# Patient Record
Sex: FEMALE | URBAN - METROPOLITAN AREA
[De-identification: names, ages, dates, MRNs, and addresses within clinical notes are randomized per-mention and may not be internally consistent; named-entity substitution may affect disease eponyms.]

---

## 2017-02-17 ENCOUNTER — OUTPATIENT (OUTPATIENT)
Dept: OUTPATIENT SERVICES | Facility: HOSPITAL | Age: 37
LOS: 1 days | Discharge: HOME | End: 2017-02-17

## 2017-02-17 ENCOUNTER — APPOINTMENT (OUTPATIENT)
Dept: ANTEPARTUM | Facility: CLINIC | Age: 37
End: 2017-02-17

## 2017-02-17 VITALS — SYSTOLIC BLOOD PRESSURE: 130 MMHG | DIASTOLIC BLOOD PRESSURE: 90 MMHG | BODY MASS INDEX: 30.7 KG/M2 | WEIGHT: 196 LBS

## 2017-02-17 VITALS — HEART RATE: 90 BPM | OXYGEN SATURATION: 100 % | TEMPERATURE: 98 F

## 2017-02-17 LAB
BILIRUB UR QL STRIP: NEGATIVE
CLARITY UR: NORMAL
COLLECTION METHOD: NORMAL
GLUCOSE UR-MCNC: NEGATIVE
HCG UR QL: 0.2 EU/DL
HGB UR QL STRIP.AUTO: NEGATIVE
KETONES UR-MCNC: NORMAL
LEUKOCYTE ESTERASE UR QL STRIP: NEGATIVE
NITRITE UR QL STRIP: NEGATIVE
PH UR STRIP: 8
PROT UR STRIP-MCNC: 4
SP GR UR STRIP: 1.03

## 2017-06-27 DIAGNOSIS — F41.9 ANXIETY DISORDER, UNSPECIFIED: ICD-10-CM

## 2017-06-27 DIAGNOSIS — O99.342 OTHER MENTAL DISORDERS COMPLICATING PREGNANCY, SECOND TRIMESTER: ICD-10-CM

## 2017-06-27 DIAGNOSIS — O35.8XX1 MATERNAL CARE FOR OTHER (SUSPECTED) FETAL ABNORMALITY AND DAMAGE, FETUS 1: ICD-10-CM

## 2017-06-27 DIAGNOSIS — F31.9 BIPOLAR DISORDER, UNSPECIFIED: ICD-10-CM

## 2017-06-27 DIAGNOSIS — Z3A.27 27 WEEKS GESTATION OF PREGNANCY: ICD-10-CM

## 2017-06-27 DIAGNOSIS — O09.522 SUPERVISION OF ELDERLY MULTIGRAVIDA, SECOND TRIMESTER: ICD-10-CM

## 2017-09-05 ENCOUNTER — INPATIENT (INPATIENT)
Facility: HOSPITAL | Age: 37
LOS: 2 days | Discharge: LEFT BEFORE DISCHARGE | End: 2017-09-08
Attending: INTERNAL MEDICINE

## 2017-09-05 DIAGNOSIS — F31.9 BIPOLAR DISORDER, UNSPECIFIED: ICD-10-CM

## 2017-09-05 DIAGNOSIS — G47.9 SLEEP DISORDER, UNSPECIFIED: ICD-10-CM

## 2017-09-05 DIAGNOSIS — F42.9 OBSESSIVE-COMPULSIVE DISORDER, UNSPECIFIED: ICD-10-CM

## 2017-09-05 DIAGNOSIS — F14.20 COCAINE DEPENDENCE, UNCOMPLICATED: ICD-10-CM

## 2017-09-05 DIAGNOSIS — F60.5 OBSESSIVE-COMPULSIVE PERSONALITY DISORDER: ICD-10-CM

## 2017-09-05 DIAGNOSIS — F12.20 CANNABIS DEPENDENCE, UNCOMPLICATED: ICD-10-CM

## 2017-09-05 DIAGNOSIS — F17.200 NICOTINE DEPENDENCE, UNSPECIFIED, UNCOMPLICATED: ICD-10-CM

## 2017-09-05 DIAGNOSIS — F11.20 OPIOID DEPENDENCE, UNCOMPLICATED: ICD-10-CM

## 2017-09-05 DIAGNOSIS — F12.10 CANNABIS ABUSE, UNCOMPLICATED: ICD-10-CM

## 2017-09-05 DIAGNOSIS — F93.0 SEPARATION ANXIETY DISORDER OF CHILDHOOD: ICD-10-CM

## 2017-09-14 DIAGNOSIS — F14.20 COCAINE DEPENDENCE, UNCOMPLICATED: ICD-10-CM

## 2017-09-14 DIAGNOSIS — F12.20 CANNABIS DEPENDENCE, UNCOMPLICATED: ICD-10-CM

## 2017-09-14 DIAGNOSIS — Z53.21 PROCEDURE AND TREATMENT NOT CARRIED OUT DUE TO PATIENT LEAVING PRIOR TO BEING SEEN BY HEALTH CARE PROVIDER: ICD-10-CM

## 2017-09-14 DIAGNOSIS — F60.5 OBSESSIVE-COMPULSIVE PERSONALITY DISORDER: ICD-10-CM

## 2017-09-14 DIAGNOSIS — F11.20 OPIOID DEPENDENCE, UNCOMPLICATED: ICD-10-CM

## 2017-09-14 DIAGNOSIS — F42.9 OBSESSIVE-COMPULSIVE DISORDER, UNSPECIFIED: ICD-10-CM

## 2017-09-14 DIAGNOSIS — F17.200 NICOTINE DEPENDENCE, UNSPECIFIED, UNCOMPLICATED: ICD-10-CM

## 2018-08-24 ENCOUNTER — INPATIENT (INPATIENT)
Facility: HOSPITAL | Age: 38
LOS: 3 days | Discharge: HOME | End: 2018-08-28
Attending: INTERNAL MEDICINE | Admitting: INTERNAL MEDICINE

## 2018-08-24 VITALS
WEIGHT: 169.98 LBS | HEIGHT: 64 IN | RESPIRATION RATE: 16 BRPM | HEART RATE: 66 BPM | DIASTOLIC BLOOD PRESSURE: 77 MMHG | SYSTOLIC BLOOD PRESSURE: 116 MMHG | TEMPERATURE: 98 F

## 2018-08-24 DIAGNOSIS — Z98.890 OTHER SPECIFIED POSTPROCEDURAL STATES: Chronic | ICD-10-CM

## 2018-08-24 DIAGNOSIS — Z86.19 PERSONAL HISTORY OF OTHER INFECTIOUS AND PARASITIC DISEASES: ICD-10-CM

## 2018-08-24 DIAGNOSIS — F12.20 CANNABIS DEPENDENCE, UNCOMPLICATED: ICD-10-CM

## 2018-08-24 DIAGNOSIS — F13.20 SEDATIVE, HYPNOTIC OR ANXIOLYTIC DEPENDENCE, UNCOMPLICATED: ICD-10-CM

## 2018-08-24 DIAGNOSIS — F14.10 COCAINE ABUSE, UNCOMPLICATED: ICD-10-CM

## 2018-08-24 DIAGNOSIS — F11.10 OPIOID ABUSE, UNCOMPLICATED: ICD-10-CM

## 2018-08-24 DIAGNOSIS — F41.9 ANXIETY DISORDER, UNSPECIFIED: ICD-10-CM

## 2018-08-24 DIAGNOSIS — E66.9 OBESITY, UNSPECIFIED: ICD-10-CM

## 2018-08-24 DIAGNOSIS — F43.10 POST-TRAUMATIC STRESS DISORDER, UNSPECIFIED: ICD-10-CM

## 2018-08-24 DIAGNOSIS — F10.20 ALCOHOL DEPENDENCE, UNCOMPLICATED: ICD-10-CM

## 2018-08-24 DIAGNOSIS — F17.200 NICOTINE DEPENDENCE, UNSPECIFIED, UNCOMPLICATED: ICD-10-CM

## 2018-08-24 DIAGNOSIS — F14.20 COCAINE DEPENDENCE, UNCOMPLICATED: ICD-10-CM

## 2018-08-24 DIAGNOSIS — F11.20 OPIOID DEPENDENCE, UNCOMPLICATED: ICD-10-CM

## 2018-08-24 DIAGNOSIS — F31.9 BIPOLAR DISORDER, UNSPECIFIED: ICD-10-CM

## 2018-08-24 LAB
ALBUMIN SERPL ELPH-MCNC: 5 G/DL — SIGNIFICANT CHANGE UP (ref 3.5–5.2)
ALP SERPL-CCNC: 84 U/L — SIGNIFICANT CHANGE UP (ref 30–115)
ALT FLD-CCNC: 41 U/L — SIGNIFICANT CHANGE UP (ref 0–41)
AMPHET UR-MCNC: NEGATIVE — SIGNIFICANT CHANGE UP
ANION GAP SERPL CALC-SCNC: 14 MMOL/L — SIGNIFICANT CHANGE UP (ref 7–14)
APPEARANCE UR: CLEAR — SIGNIFICANT CHANGE UP
AST SERPL-CCNC: 38 U/L — SIGNIFICANT CHANGE UP (ref 0–41)
BARBITURATES UR SCN-MCNC: NEGATIVE — SIGNIFICANT CHANGE UP
BASOPHILS # BLD AUTO: 0.02 K/UL — SIGNIFICANT CHANGE UP (ref 0–0.2)
BASOPHILS NFR BLD AUTO: 0.3 % — SIGNIFICANT CHANGE UP (ref 0–1)
BENZODIAZ UR-MCNC: POSITIVE
BILIRUB SERPL-MCNC: 0.3 MG/DL — SIGNIFICANT CHANGE UP (ref 0.2–1.2)
BILIRUB UR-MCNC: NEGATIVE — SIGNIFICANT CHANGE UP
BUN SERPL-MCNC: 8 MG/DL — LOW (ref 10–20)
CALCIUM SERPL-MCNC: 9.6 MG/DL — SIGNIFICANT CHANGE UP (ref 8.5–10.1)
CHLORIDE SERPL-SCNC: 97 MMOL/L — LOW (ref 98–110)
CHOLEST SERPL-MCNC: 215 MG/DL — HIGH (ref 100–200)
CO2 SERPL-SCNC: 27 MMOL/L — SIGNIFICANT CHANGE UP (ref 17–32)
COCAINE METAB.OTHER UR-MCNC: POSITIVE
COLOR SPEC: YELLOW — SIGNIFICANT CHANGE UP
CREAT SERPL-MCNC: 0.8 MG/DL — SIGNIFICANT CHANGE UP (ref 0.7–1.5)
DIFF PNL FLD: NEGATIVE — SIGNIFICANT CHANGE UP
EOSINOPHIL # BLD AUTO: 0.18 K/UL — SIGNIFICANT CHANGE UP (ref 0–0.7)
EOSINOPHIL NFR BLD AUTO: 3.1 % — SIGNIFICANT CHANGE UP (ref 0–8)
EPI CELLS # UR: ABNORMAL /HPF
ESTIMATED AVERAGE GLUCOSE: 100 MG/DL — SIGNIFICANT CHANGE UP (ref 68–114)
GGT SERPL-CCNC: 38 U/L — SIGNIFICANT CHANGE UP (ref 1–40)
GLUCOSE SERPL-MCNC: 120 MG/DL — HIGH (ref 70–99)
GLUCOSE UR QL: NEGATIVE MG/DL — SIGNIFICANT CHANGE UP
HBA1C BLD-MCNC: 5.1 % — SIGNIFICANT CHANGE UP (ref 4–5.6)
HCG UR QL: NEGATIVE — SIGNIFICANT CHANGE UP
HCT VFR BLD CALC: 43.2 % — SIGNIFICANT CHANGE UP (ref 37–47)
HDLC SERPL-MCNC: 58 MG/DL — SIGNIFICANT CHANGE UP
HGB BLD-MCNC: 14.2 G/DL — SIGNIFICANT CHANGE UP (ref 12–16)
IMM GRANULOCYTES NFR BLD AUTO: 0.3 % — SIGNIFICANT CHANGE UP (ref 0.1–0.3)
KETONES UR-MCNC: NEGATIVE — SIGNIFICANT CHANGE UP
LEUKOCYTE ESTERASE UR-ACNC: NEGATIVE — SIGNIFICANT CHANGE UP
LIPID PNL WITH DIRECT LDL SERPL: 138 MG/DL — HIGH (ref 4–129)
LYMPHOCYTES # BLD AUTO: 1.98 K/UL — SIGNIFICANT CHANGE UP (ref 1.2–3.4)
LYMPHOCYTES # BLD AUTO: 34.4 % — SIGNIFICANT CHANGE UP (ref 20.5–51.1)
MAGNESIUM SERPL-MCNC: 2.2 MG/DL — SIGNIFICANT CHANGE UP (ref 1.8–2.4)
MCHC RBC-ENTMCNC: 29.9 PG — SIGNIFICANT CHANGE UP (ref 27–31)
MCHC RBC-ENTMCNC: 32.9 G/DL — SIGNIFICANT CHANGE UP (ref 32–37)
MCV RBC AUTO: 90.9 FL — SIGNIFICANT CHANGE UP (ref 81–99)
METHADONE UR-MCNC: NEGATIVE — SIGNIFICANT CHANGE UP
MONOCYTES # BLD AUTO: 0.28 K/UL — SIGNIFICANT CHANGE UP (ref 0.1–0.6)
MONOCYTES NFR BLD AUTO: 4.9 % — SIGNIFICANT CHANGE UP (ref 1.7–9.3)
NEUTROPHILS # BLD AUTO: 3.28 K/UL — SIGNIFICANT CHANGE UP (ref 1.4–6.5)
NEUTROPHILS NFR BLD AUTO: 57 % — SIGNIFICANT CHANGE UP (ref 42.2–75.2)
NITRITE UR-MCNC: NEGATIVE — SIGNIFICANT CHANGE UP
NRBC # BLD: 0 /100 WBCS — SIGNIFICANT CHANGE UP (ref 0–0)
OPIATES UR-MCNC: POSITIVE
PCP SPEC-MCNC: SIGNIFICANT CHANGE UP
PH UR: 6.5 — SIGNIFICANT CHANGE UP (ref 5–8)
PLATELET # BLD AUTO: 224 K/UL — SIGNIFICANT CHANGE UP (ref 130–400)
POTASSIUM SERPL-MCNC: 4.3 MMOL/L — SIGNIFICANT CHANGE UP (ref 3.5–5)
POTASSIUM SERPL-SCNC: 4.3 MMOL/L — SIGNIFICANT CHANGE UP (ref 3.5–5)
PROPOXYPHENE QUALITATIVE URINE RESULT: NEGATIVE — SIGNIFICANT CHANGE UP
PROT SERPL-MCNC: 8.1 G/DL — HIGH (ref 6–8)
PROT UR-MCNC: 30 MG/DL
RBC # BLD: 4.75 M/UL — SIGNIFICANT CHANGE UP (ref 4.2–5.4)
RBC # FLD: 12.3 % — SIGNIFICANT CHANGE UP (ref 11.5–14.5)
SODIUM SERPL-SCNC: 138 MMOL/L — SIGNIFICANT CHANGE UP (ref 135–146)
SP GR SPEC: >=1.03 (ref 1.01–1.03)
TOTAL CHOLESTEROL/HDL RATIO MEASUREMENT: 3.7 RATIO — LOW (ref 4–5.5)
TRIGL SERPL-MCNC: 129 MG/DL — SIGNIFICANT CHANGE UP (ref 10–149)
UROBILINOGEN FLD QL: 1 MG/DL (ref 0.2–0.2)
WBC # BLD: 5.76 K/UL — SIGNIFICANT CHANGE UP (ref 4.8–10.8)
WBC # FLD AUTO: 5.76 K/UL — SIGNIFICANT CHANGE UP (ref 4.8–10.8)
WBC UR QL: ABNORMAL /HPF

## 2018-08-24 RX ORDER — QUETIAPINE FUMARATE 200 MG/1
25 TABLET, FILM COATED ORAL DAILY
Qty: 0 | Refills: 0 | Status: DISCONTINUED | OUTPATIENT
Start: 2018-08-24 | End: 2018-08-28

## 2018-08-24 RX ORDER — HYDROCORTISONE 1 %
1 OINTMENT (GRAM) TOPICAL
Qty: 0 | Refills: 0 | Status: DISCONTINUED | OUTPATIENT
Start: 2018-08-24 | End: 2018-08-28

## 2018-08-24 RX ORDER — NICOTINE POLACRILEX 2 MG
1 GUM BUCCAL DAILY
Qty: 0 | Refills: 0 | Status: DISCONTINUED | OUTPATIENT
Start: 2018-08-24 | End: 2018-08-28

## 2018-08-24 RX ORDER — HYDROXYZINE HCL 10 MG
100 TABLET ORAL AT BEDTIME
Qty: 0 | Refills: 0 | Status: DISCONTINUED | OUTPATIENT
Start: 2018-08-24 | End: 2018-08-28

## 2018-08-24 RX ORDER — PHENOBARBITAL 60 MG
32.4 TABLET ORAL EVERY 12 HOURS
Qty: 0 | Refills: 0 | Status: DISCONTINUED | OUTPATIENT
Start: 2018-08-27 | End: 2018-08-28

## 2018-08-24 RX ORDER — ACETAMINOPHEN 500 MG
650 TABLET ORAL EVERY 4 HOURS
Qty: 0 | Refills: 0 | Status: DISCONTINUED | OUTPATIENT
Start: 2018-08-24 | End: 2018-08-28

## 2018-08-24 RX ORDER — ESCITALOPRAM OXALATE 10 MG/1
20 TABLET, FILM COATED ORAL DAILY
Qty: 0 | Refills: 0 | Status: DISCONTINUED | OUTPATIENT
Start: 2018-08-24 | End: 2018-08-28

## 2018-08-24 RX ORDER — BUPRENORPHINE AND NALOXONE 2; .5 MG/1; MG/1
1 TABLET SUBLINGUAL DAILY
Qty: 0 | Refills: 0 | Status: DISCONTINUED | OUTPATIENT
Start: 2018-08-25 | End: 2018-08-28

## 2018-08-24 RX ORDER — PHENOBARBITAL 60 MG
32.4 TABLET ORAL EVERY 6 HOURS
Qty: 0 | Refills: 0 | Status: DISCONTINUED | OUTPATIENT
Start: 2018-08-24 | End: 2018-08-25

## 2018-08-24 RX ORDER — PHENOBARBITAL 60 MG
32.4 TABLET ORAL EVERY 4 HOURS
Qty: 0 | Refills: 0 | Status: DISCONTINUED | OUTPATIENT
Start: 2018-08-24 | End: 2018-08-28

## 2018-08-24 RX ORDER — GABAPENTIN 400 MG/1
300 CAPSULE ORAL THREE TIMES A DAY
Qty: 0 | Refills: 0 | Status: DISCONTINUED | OUTPATIENT
Start: 2018-08-24 | End: 2018-08-28

## 2018-08-24 RX ORDER — MULTIVIT-MIN/FERROUS GLUCONATE 9 MG/15 ML
1 LIQUID (ML) ORAL DAILY
Qty: 0 | Refills: 0 | Status: DISCONTINUED | OUTPATIENT
Start: 2018-08-24 | End: 2018-08-28

## 2018-08-24 RX ORDER — PHENOBARBITAL 60 MG
TABLET ORAL
Qty: 0 | Refills: 0 | Status: COMPLETED | OUTPATIENT
Start: 2018-08-24 | End: 2018-08-28

## 2018-08-24 RX ORDER — MAGNESIUM HYDROXIDE 400 MG/1
30 TABLET, CHEWABLE ORAL ONCE
Qty: 0 | Refills: 0 | Status: DISCONTINUED | OUTPATIENT
Start: 2018-08-24 | End: 2018-08-28

## 2018-08-24 RX ORDER — PSEUDOEPHEDRINE HCL 30 MG
60 TABLET ORAL EVERY 6 HOURS
Qty: 0 | Refills: 0 | Status: DISCONTINUED | OUTPATIENT
Start: 2018-08-24 | End: 2018-08-28

## 2018-08-24 RX ORDER — HYDROXYZINE HCL 10 MG
50 TABLET ORAL EVERY 6 HOURS
Qty: 0 | Refills: 0 | Status: DISCONTINUED | OUTPATIENT
Start: 2018-08-24 | End: 2018-08-25

## 2018-08-24 RX ORDER — PHENOBARBITAL 60 MG
32.4 TABLET ORAL EVERY 6 HOURS
Qty: 0 | Refills: 0 | Status: DISCONTINUED | OUTPATIENT
Start: 2018-08-26 | End: 2018-08-26

## 2018-08-24 RX ORDER — METHOCARBAMOL 500 MG/1
500 TABLET, FILM COATED ORAL EVERY 6 HOURS
Qty: 0 | Refills: 0 | Status: DISCONTINUED | OUTPATIENT
Start: 2018-08-24 | End: 2018-08-28

## 2018-08-24 RX ORDER — IBUPROFEN 200 MG
600 TABLET ORAL EVERY 6 HOURS
Qty: 0 | Refills: 0 | Status: DISCONTINUED | OUTPATIENT
Start: 2018-08-24 | End: 2018-08-28

## 2018-08-24 RX ORDER — QUETIAPINE FUMARATE 200 MG/1
100 TABLET, FILM COATED ORAL AT BEDTIME
Qty: 0 | Refills: 0 | Status: DISCONTINUED | OUTPATIENT
Start: 2018-08-24 | End: 2018-08-28

## 2018-08-24 RX ADMIN — Medication 32.4 MILLIGRAM(S): at 17:16

## 2018-08-24 RX ADMIN — Medication 1 APPLICATION(S): at 16:19

## 2018-08-24 RX ADMIN — Medication 1 TABLET(S): at 13:00

## 2018-08-24 RX ADMIN — GABAPENTIN 300 MILLIGRAM(S): 400 CAPSULE ORAL at 21:18

## 2018-08-24 RX ADMIN — QUETIAPINE FUMARATE 25 MILLIGRAM(S): 200 TABLET, FILM COATED ORAL at 13:01

## 2018-08-24 RX ADMIN — Medication 1 APPLICATION(S): at 21:18

## 2018-08-24 RX ADMIN — Medication 1 PATCH: at 13:04

## 2018-08-24 RX ADMIN — ESCITALOPRAM OXALATE 20 MILLIGRAM(S): 10 TABLET, FILM COATED ORAL at 13:00

## 2018-08-24 RX ADMIN — GABAPENTIN 300 MILLIGRAM(S): 400 CAPSULE ORAL at 13:00

## 2018-08-24 RX ADMIN — Medication 100 MILLIGRAM(S): at 21:21

## 2018-08-24 RX ADMIN — QUETIAPINE FUMARATE 100 MILLIGRAM(S): 200 TABLET, FILM COATED ORAL at 21:18

## 2018-08-24 NOTE — H&P ADULT - ATTENDING COMMENTS
Patient interviewed and examined.    Chart reviewed.    PA's H&P noted and modified, as appropriate.    Case discussed on team rounds    Following is my summary of the case.    Admitted for detox: from ____ED, _x__Intake, ____Med/Surg Floor    Alcohol____   Opioid__x___  Benzo_x__ Other_____    Substance amount, duration of use, last usage, and prior attempts at detox or rehabs, are outlined above in the H&P and discussed with patient.    Associated withdrawal symptoms presents.  Comorbid conditions noted. Chronic and Stable.    Past Medical Hx, Psych Hx, family Hx, Social Hx from H&P reviewed and NO changes.    Old medical record and medication Hx. Reviewed    Following items reviewed and addressed:  1. labs  2. EKG  3. Imaging from PACs module    Examination: no change from PA's exam.    Place on following protocol  _____Medically Managed  __X__Medically Supervised    Ciwa_____Librium taper____Ativan taper___Methadone taper___ Phenobarb taper__x__ Suboxone Induction__x__MMTP____    Narcan Kit Offered    Psych Consult __X__N/A  ___Ordered    Physical Therapy  ___X    ___  Ordered    Aftercare disposition to be addressed by counselors.    Estimated length of stay 3-5 days.

## 2018-08-24 NOTE — H&P ADULT - NSHPPHYSICALEXAM_GEN_ALL_CORE
-  Vital Signs:      Temp: 98.0      Pulse: 74      RR:14       BP:  124/78              Constitutional: anxious A&Ox3, WD/WN  Eyes: PERRLA  Respiratory: +air entry, no rales, no rhonchi, no wheezes  Cardiovascular: +S1 and S2,RRR  Gastrointestinal: +BS, soft, non-tender, not distended, No CVAT  Extremities: no cyanosis, no edema, no calf tenderness,   Vascular: +dorsal pedis and radial pulses, no extremity cyanosis  Neurological: sensation intact, ROM equal B/L, CN II-XII intact, Gait: steady  Skin: no rashes, normal turgor, No track marks   No Decubiti present  No IV lines present  Rectal/Breasts Exam: Deferred

## 2018-08-24 NOTE — H&P ADULT - PMH
Anxiety and depression    Bipolar disorder    Cocaine dependence    Heroin overdose    History of HPV infection    Nicotine dependence    Obesity    Opiate dependence, continuous    PTSD (post-traumatic stress disorder)

## 2018-08-24 NOTE — H&P ADULT - PROBLEM SELECTOR PLAN 3
Check Urine Toxicology  Continue Suboxone 8/2 mg SL daily  Monitor VS and withdrawal symptoms  Prn Medication

## 2018-08-24 NOTE — H&P ADULT - PROBLEM SELECTOR PLAN 5
Encourage Proper DIET & EXERCISE  F/U with PMD after D/C as an outpatient  Check Lipid Profile  Check HgA1C

## 2018-08-24 NOTE — H&P ADULT - PROBLEM SELECTOR PLAN 7
Observation  Atarax 50 mg po Q6H  PRN anxiety  Atarax 100 mg po QHS PRN Insomnia  Psych. Consult Prn  Continue :  Lexapro 20 mg po daily  Neurontin 300 mg po TID  Seroquel 25 mg po daily  Seroquel 100 mg po q hs

## 2018-08-24 NOTE — H&P ADULT - ASSESSMENT
This is 37 Y/O  Female with Hx of Continuous Benzo,Opiateand Crack dependency,on Prescribed Suboxone by DR. Vineet Bundy. S/P Detox at [facility] 9/05/2017-9/08/2017,stayed clean X 4 months,relapsed 3 months PTA

## 2018-08-24 NOTE — H&P ADULT - HISTORY OF PRESENT ILLNESS
This is 37 Y/O  Female with Hx of Continuous Benzo,Opiateand Crack dependency,on Prescribed Suboxone by DR. Vineet Bundy. S/P Detox at [facility] 2017-2017,stayed clean X 4 months,relapsed 3 months PTA    DRUG	AGE OF ONSET	ROUTE	FREQ	AMOUNT	LAST USE	LENGTH OF CURRENT USE	  HEROIN	34 Y/O	In/IV	Daily	10 bags 	2018 7 Bags	3 Months	  XANAX	15 Y/O	PO	Daily	4 mg	2018 4 mg	3 Months	  CRACK	34 Y/O	Smoking	Daily	$50	2018 $50	3 Months	  SUBOXONE 8/2 mg	37 Y/O	SL	Daily	1 Film	2018	6 Month	  							  Opiate W/D: Mylagia,N/V/D,HA,Diaphoresis,anxiety,and Insomnia,                       (+) Accidental OD X 2 the past 2 weeks  Patient A&Ox3, denies CP, sob,Abdominal pain,blurry Vision, headache, dizziness, bleeding or  dysuria.  OBGYN Hx: LMP: 2018    last Pap smear 2018  ( Hx of HPV)     P: 0    S/P  X 1,and Miscarriage X 1           last   Mammography :  (WNL)  Hx x of Withdrawal Seizures: No   Psyhx: Bipolar D/O,Anxiety,PTSD   Compliant with meds             Denies any S/H Ideation or A/V Hallucination  Screening history	Last tested	Result	History of treatment	  HIV		NEG	N/A	  Hepatitis C	2017	NEG	N/A	  Quantiferon GOLD TB test	2015	NEG	N/A	    Immunization	Not Received	Unknown	Received	Date Received 	  Influenza		v			  Pneumococcus		v			  Tetanus			v	Ø	10 years	  Others					  					    I-Stop:   Patient Name:	Rosalva Wayne	YOB: 1980	  Address:	[address] [place] [address]	Sex:	Female	  Rx Written	Rx Dispensed	Drug	Quantity	Days Supply	Prescriber Name	  2018	zolpidem tartrate 5 mg tablet	30	30	Vineet Bundy MD	  07/10/2018	07/10/2018	suboxone 8 mg-2 mg sl film	60	30	Vineet Bundy MD	  2018	phentermine 30 mg capsule	30	30	Vineet Bundy MD	  2018	suboxone 8 mg-2 mg sl film	60	30	Vineet Bundy MD

## 2018-08-25 LAB
HAV IGM SER-ACNC: SIGNIFICANT CHANGE UP
HBV CORE IGM SER-ACNC: SIGNIFICANT CHANGE UP
HBV SURFACE AG SER-ACNC: SIGNIFICANT CHANGE UP
HCV AB S/CO SERPL IA: 0.14 S/CO — SIGNIFICANT CHANGE UP
HCV AB SERPL-IMP: SIGNIFICANT CHANGE UP
HIV 1+2 AB+HIV1 P24 AG SERPL QL IA: SIGNIFICANT CHANGE UP
T PALLIDUM AB TITR SER: NEGATIVE — SIGNIFICANT CHANGE UP

## 2018-08-25 RX ORDER — HYDROXYZINE HCL 10 MG
50 TABLET ORAL
Qty: 0 | Refills: 0 | Status: DISCONTINUED | OUTPATIENT
Start: 2018-08-25 | End: 2018-08-26

## 2018-08-25 RX ADMIN — QUETIAPINE FUMARATE 25 MILLIGRAM(S): 200 TABLET, FILM COATED ORAL at 11:13

## 2018-08-25 RX ADMIN — Medication 50 MILLIGRAM(S): at 10:08

## 2018-08-25 RX ADMIN — GABAPENTIN 300 MILLIGRAM(S): 400 CAPSULE ORAL at 12:47

## 2018-08-25 RX ADMIN — Medication 1 PATCH: at 09:44

## 2018-08-25 RX ADMIN — QUETIAPINE FUMARATE 100 MILLIGRAM(S): 200 TABLET, FILM COATED ORAL at 21:23

## 2018-08-25 RX ADMIN — ESCITALOPRAM OXALATE 20 MILLIGRAM(S): 10 TABLET, FILM COATED ORAL at 11:13

## 2018-08-25 RX ADMIN — Medication 32.4 MILLIGRAM(S): at 05:53

## 2018-08-25 RX ADMIN — Medication 1 APPLICATION(S): at 09:44

## 2018-08-25 RX ADMIN — Medication 32.4 MILLIGRAM(S): at 23:38

## 2018-08-25 RX ADMIN — GABAPENTIN 300 MILLIGRAM(S): 400 CAPSULE ORAL at 21:23

## 2018-08-25 RX ADMIN — Medication 32.4 MILLIGRAM(S): at 00:30

## 2018-08-25 RX ADMIN — GABAPENTIN 300 MILLIGRAM(S): 400 CAPSULE ORAL at 09:45

## 2018-08-25 RX ADMIN — Medication 32.4 MILLIGRAM(S): at 18:24

## 2018-08-25 RX ADMIN — Medication 32.4 MILLIGRAM(S): at 12:47

## 2018-08-25 RX ADMIN — Medication 600 MILLIGRAM(S): at 22:45

## 2018-08-25 RX ADMIN — Medication 1 TABLET(S): at 09:44

## 2018-08-25 RX ADMIN — Medication 1 APPLICATION(S): at 21:23

## 2018-08-25 RX ADMIN — Medication 600 MILLIGRAM(S): at 21:04

## 2018-08-25 RX ADMIN — Medication 50 MILLIGRAM(S): at 21:23

## 2018-08-25 RX ADMIN — Medication 1 PATCH: at 15:00

## 2018-08-26 LAB
GAMMA INTERFERON BACKGROUND BLD IA-ACNC: 0.04 IU/ML — SIGNIFICANT CHANGE UP
M TB IFN-G BLD-IMP: NEGATIVE — SIGNIFICANT CHANGE UP
M TB IFN-G CD4+ BCKGRND COR BLD-ACNC: 0.08 IU/ML — SIGNIFICANT CHANGE UP
M TB IFN-G CD4+CD8+ BCKGRND COR BLD-ACNC: 0.07 IU/ML — SIGNIFICANT CHANGE UP
QUANT TB PLUS MITOGEN MINUS NIL: >10 IU/ML — SIGNIFICANT CHANGE UP

## 2018-08-26 RX ORDER — HYDROXYZINE HCL 10 MG
50 TABLET ORAL EVERY 8 HOURS
Qty: 0 | Refills: 0 | Status: DISCONTINUED | OUTPATIENT
Start: 2018-08-26 | End: 2018-08-28

## 2018-08-26 RX ORDER — HYDROXYZINE HCL 10 MG
50 TABLET ORAL ONCE
Qty: 0 | Refills: 0 | Status: COMPLETED | OUTPATIENT
Start: 2018-08-26 | End: 2018-08-26

## 2018-08-26 RX ADMIN — QUETIAPINE FUMARATE 100 MILLIGRAM(S): 200 TABLET, FILM COATED ORAL at 21:18

## 2018-08-26 RX ADMIN — Medication 1 PATCH: at 09:09

## 2018-08-26 RX ADMIN — Medication 50 MILLIGRAM(S): at 09:07

## 2018-08-26 RX ADMIN — BUPRENORPHINE AND NALOXONE 1 TABLET(S): 2; .5 TABLET SUBLINGUAL at 09:06

## 2018-08-26 RX ADMIN — Medication 1 APPLICATION(S): at 09:07

## 2018-08-26 RX ADMIN — Medication 50 MILLIGRAM(S): at 18:18

## 2018-08-26 RX ADMIN — Medication 100 MILLIGRAM(S): at 21:20

## 2018-08-26 RX ADMIN — ESCITALOPRAM OXALATE 20 MILLIGRAM(S): 10 TABLET, FILM COATED ORAL at 09:06

## 2018-08-26 RX ADMIN — Medication 1 TABLET(S): at 09:07

## 2018-08-26 RX ADMIN — GABAPENTIN 300 MILLIGRAM(S): 400 CAPSULE ORAL at 09:06

## 2018-08-26 RX ADMIN — Medication 32.4 MILLIGRAM(S): at 05:51

## 2018-08-26 RX ADMIN — QUETIAPINE FUMARATE 25 MILLIGRAM(S): 200 TABLET, FILM COATED ORAL at 09:08

## 2018-08-26 RX ADMIN — GABAPENTIN 300 MILLIGRAM(S): 400 CAPSULE ORAL at 21:18

## 2018-08-26 NOTE — CHART NOTE - NSCHARTNOTEFT_GEN_A_CORE
Subsequent Inpatient Encounter                                       Detox Unit    MARQUITA CUMMINGS   38y   Female      Chief Complaint:    Follow up for Polysubstance  Dependency    HPI:     I reviewed previous notes. No Change, except if noted below.             Detail:_    ROS:   I reviewed with patient.  No changes from previous notes except if noted below.             Detail: _    PFSH I reviewed with patient. No changes from previous notes except if noted below.             Detail_    Medication reconciliation performed.    MEDICATIONS  (STANDING):  buprenorphine 8 mG/naloxone 2 mG SL  Tablet 1 Tablet(s) SubLingual daily  escitalopram 20 milliGRAM(s) Oral daily  gabapentin 300 milliGRAM(s) Oral three times a day  hydrocortisone 1% Cream 1 Application(s) Topical two times a day  hydrOXYzine hydrochloride 50 milliGRAM(s) Oral two times a day  multivitamin/minerals 1 Tablet(s) Oral daily  nicotine - 21 mG/24Hr(s) Patch 1 Patch Transdermal daily  PHENobarbital 32.4 milliGRAM(s) Oral every 6 hours  PHENobarbital   Oral   QUEtiapine 25 milliGRAM(s) Oral daily  QUEtiapine 100 milliGRAM(s) Oral at bedtime      MEDICATIONS  (PRN):  acetaminophen   Tablet 650 milliGRAM(s) Oral every 4 hours PRN For Temp greater than 38.5 C (101.3 F)  aluminum hydroxide/magnesium hydroxide/simethicone Suspension 30 milliLiter(s) Oral every 6 hours PRN Heartburn  bismuth subsalicylate Liquid 30 milliLiter(s) Oral every 6 hours PRN Diarrhea  cloNIDine 0.1 milliGRAM(s) Oral every 8 hours PRN Blood Pressure GREATER THAN 140/90 mmHG  cloNIDine 0.1 milliGRAM(s) Oral every 8 hours PRN opiate withdrawal  guaiFENesin/dextromethorphan  Syrup 5 milliLiter(s) Oral every 4 hours PRN Cough  hydrOXYzine hydrochloride 100 milliGRAM(s) Oral at bedtime PRN insomnia  ibuprofen  Tablet 600 milliGRAM(s) Oral every 6 hours PRN Mild Pain (1-3)  magnesium hydroxide Suspension 30 milliLiter(s) Oral once PRN Constipation  methocarbamol 500 milliGRAM(s) Oral every 6 hours PRN muscle pain  PHENobarbital 32.4 milliGRAM(s) Oral every 4 hours PRN Withdrawal  pseudoephedrine 60 milliGRAM(s) Oral every 6 hours PRN Rhinitis  trimethobenzamide 300 milliGRAM(s) Oral every 6 hours PRN Nausea and/or Vomiting  trimethobenzamide Injectable 200 milliGRAM(s) IntraMuscular every 6 hours PRN Nausea and/or Vomiting      T(C): 36.7 (08-26-18 @ 06:00), Max: 36.8 (08-25-18 @ 12:00)  HR: 69 (08-26-18 @ 06:00) (65 - 70)  BP: 109/67 (08-26-18 @ 06:00) (109/67 - 126/73)  RR: 14 (08-26-18 @ 06:00) (14 - 16)  SpO2: --    PHYSICAL EXAM:      Constitutional: NAD, A&O x3    Eyes: PERRLA, no conjuctivitis    Neck: no lymphadenopathy    Respiratory: +air entry, no rales, no rhonchi, no wheezes    Cardiovascular: +S1 and S2, regular rate and rhythm    Gastrointestinal: +BS, soft, non-tender, not distended    Extremities:  no edema, no calf tenderness    Skin: no rashes, normal turgor                            14.2   5.76  )-----------( 224      ( 24 Aug 2018 12:50 )             43.2   08-24    138  |  97<L>  |  8<L>  ----------------------------<  120<H>  4.3   |  27  |  0.8    Ca    9.6      24 Aug 2018 12:50  Mg     2.2     08-24    TPro  8.1<H>  /  Alb  5.0  /  TBili  0.3  /  DBili  x   /  AST  38  /  ALT  41  /  AlkPhos  84  08-24    Magnesium, Serum: 2.2 mg/dL (08-24-18 @ 12:50)  Hemoglobin A1C, Whole Blood: 5.1 % (08-24-18 @ 12:50)  Treponema Pallidum Antibody Interpretation: Negative (08-24-18 @ 12:50)  Hepatitis B Surface Antigen: Nonreact (08-24-18 @ 12:50)  Hepatitis C Virus S/CO Ratio: 0.14 S/CO (08-24-18 @ 12:50)    Hepatitis C Virus Interpretation: Nonreact (08-24-18 @ 12:50)      Urinalysis Basic - ( 24 Aug 2018 12:39 )    Color: Yellow / Appearance: Clear / SG: >=1.030 / pH: x  Gluc: x / Ketone: Negative  / Bili: Negative / Urobili: 1.0 mg/dL   Blood: x / Protein: 30 mg/dL / Nitrite: Negative   Leuk Esterase: Negative / RBC: x / WBC 6-10 /HPF   Sq Epi: x / Non Sq Epi: Moderate /HPF / Bacteria: x          Impression and Plan:    Primary Diagnosis:  Benzo/Opiate Dependency                                Medication: Pheno/Suboxone    Secondary Diagnosis:                                                                                Medication:    Tertiary Diagnosis:                                                                                     Medication:      Continue Detox Protocols. Use of PRNS as needed for withdrawal and comfort.    Adjustments to protocols:    Labs/ Tests reviewed.    Tests ordered:     Likely Disposition: _X__Home       ___Rehab       ___Outpatient Program    ___Self Help     _____Other    Estimated Length of stay:_4___

## 2018-08-27 RX ADMIN — Medication 32.4 MILLIGRAM(S): at 06:12

## 2018-08-27 RX ADMIN — ESCITALOPRAM OXALATE 20 MILLIGRAM(S): 10 TABLET, FILM COATED ORAL at 09:35

## 2018-08-27 RX ADMIN — QUETIAPINE FUMARATE 100 MILLIGRAM(S): 200 TABLET, FILM COATED ORAL at 21:30

## 2018-08-27 RX ADMIN — BUPRENORPHINE AND NALOXONE 1 TABLET(S): 2; .5 TABLET SUBLINGUAL at 09:35

## 2018-08-27 RX ADMIN — GABAPENTIN 300 MILLIGRAM(S): 400 CAPSULE ORAL at 09:35

## 2018-08-27 RX ADMIN — Medication 1 PATCH: at 09:36

## 2018-08-27 RX ADMIN — Medication 50 MILLIGRAM(S): at 09:39

## 2018-08-27 RX ADMIN — QUETIAPINE FUMARATE 25 MILLIGRAM(S): 200 TABLET, FILM COATED ORAL at 09:36

## 2018-08-27 RX ADMIN — Medication 1 TABLET(S): at 09:36

## 2018-08-27 RX ADMIN — Medication 100 MILLIGRAM(S): at 21:31

## 2018-08-27 RX ADMIN — Medication 1 PATCH: at 09:39

## 2018-08-27 RX ADMIN — Medication 1 APPLICATION(S): at 09:36

## 2018-08-27 RX ADMIN — GABAPENTIN 300 MILLIGRAM(S): 400 CAPSULE ORAL at 21:30

## 2018-08-27 RX ADMIN — Medication 32.4 MILLIGRAM(S): at 17:58

## 2018-08-28 VITALS
SYSTOLIC BLOOD PRESSURE: 91 MMHG | TEMPERATURE: 97 F | HEART RATE: 66 BPM | RESPIRATION RATE: 16 BRPM | DIASTOLIC BLOOD PRESSURE: 51 MMHG

## 2018-08-28 RX ADMIN — Medication 1 PATCH: at 09:09

## 2018-08-28 RX ADMIN — BUPRENORPHINE AND NALOXONE 1 TABLET(S): 2; .5 TABLET SUBLINGUAL at 09:06

## 2018-08-28 RX ADMIN — ESCITALOPRAM OXALATE 20 MILLIGRAM(S): 10 TABLET, FILM COATED ORAL at 09:06

## 2018-08-28 RX ADMIN — Medication 1 APPLICATION(S): at 09:07

## 2018-08-28 RX ADMIN — QUETIAPINE FUMARATE 25 MILLIGRAM(S): 200 TABLET, FILM COATED ORAL at 09:06

## 2018-08-28 RX ADMIN — Medication 1 TABLET(S): at 09:06

## 2018-08-28 RX ADMIN — GABAPENTIN 300 MILLIGRAM(S): 400 CAPSULE ORAL at 09:06

## 2018-09-04 DIAGNOSIS — F14.20 COCAINE DEPENDENCE, UNCOMPLICATED: ICD-10-CM

## 2018-09-04 DIAGNOSIS — F31.9 BIPOLAR DISORDER, UNSPECIFIED: ICD-10-CM

## 2018-09-04 DIAGNOSIS — F32.9 MAJOR DEPRESSIVE DISORDER, SINGLE EPISODE, UNSPECIFIED: ICD-10-CM

## 2018-09-04 DIAGNOSIS — F13.20 SEDATIVE, HYPNOTIC OR ANXIOLYTIC DEPENDENCE, UNCOMPLICATED: ICD-10-CM

## 2018-09-04 DIAGNOSIS — F17.210 NICOTINE DEPENDENCE, CIGARETTES, UNCOMPLICATED: ICD-10-CM

## 2018-09-04 DIAGNOSIS — F41.9 ANXIETY DISORDER, UNSPECIFIED: ICD-10-CM

## 2018-09-04 DIAGNOSIS — F43.10 POST-TRAUMATIC STRESS DISORDER, UNSPECIFIED: ICD-10-CM

## 2018-09-04 DIAGNOSIS — F11.20 OPIOID DEPENDENCE, UNCOMPLICATED: ICD-10-CM

## 2018-09-04 DIAGNOSIS — E66.9 OBESITY, UNSPECIFIED: ICD-10-CM

## 2019-04-01 ENCOUNTER — OUTPATIENT (OUTPATIENT)
Dept: OUTPATIENT SERVICES | Facility: HOSPITAL | Age: 39
LOS: 1 days | End: 2019-04-01

## 2019-04-01 DIAGNOSIS — Z98.890 OTHER SPECIFIED POSTPROCEDURAL STATES: Chronic | ICD-10-CM

## 2019-04-10 ENCOUNTER — EMERGENCY (EMERGENCY)
Facility: HOSPITAL | Age: 39
LOS: 0 days | Discharge: HOME | End: 2019-04-10
Attending: EMERGENCY MEDICINE | Admitting: EMERGENCY MEDICINE
Payer: MEDICAID

## 2019-04-10 VITALS
RESPIRATION RATE: 18 BRPM | DIASTOLIC BLOOD PRESSURE: 67 MMHG | SYSTOLIC BLOOD PRESSURE: 115 MMHG | TEMPERATURE: 99 F | HEART RATE: 78 BPM | OXYGEN SATURATION: 99 %

## 2019-04-10 VITALS — HEIGHT: 64 IN | WEIGHT: 169.98 LBS

## 2019-04-10 DIAGNOSIS — F17.200 NICOTINE DEPENDENCE, UNSPECIFIED, UNCOMPLICATED: ICD-10-CM

## 2019-04-10 DIAGNOSIS — Z98.890 OTHER SPECIFIED POSTPROCEDURAL STATES: Chronic | ICD-10-CM

## 2019-04-10 DIAGNOSIS — Z79.899 OTHER LONG TERM (CURRENT) DRUG THERAPY: ICD-10-CM

## 2019-04-10 DIAGNOSIS — L03.113 CELLULITIS OF RIGHT UPPER LIMB: ICD-10-CM

## 2019-04-10 DIAGNOSIS — M79.601 PAIN IN RIGHT ARM: ICD-10-CM

## 2019-04-10 DIAGNOSIS — L02.511 CUTANEOUS ABSCESS OF RIGHT HAND: ICD-10-CM

## 2019-04-10 PROCEDURE — 73090 X-RAY EXAM OF FOREARM: CPT | Mod: 26,RT

## 2019-04-10 PROCEDURE — 99283 EMERGENCY DEPT VISIT LOW MDM: CPT | Mod: 25

## 2019-04-10 PROCEDURE — 10060 I&D ABSCESS SIMPLE/SINGLE: CPT

## 2019-04-10 RX ORDER — CIPROFLOXACIN LACTATE 400MG/40ML
1 VIAL (ML) INTRAVENOUS
Qty: 14 | Refills: 0 | OUTPATIENT
Start: 2019-04-10 | End: 2019-04-16

## 2019-04-10 RX ORDER — IBUPROFEN 200 MG
600 TABLET ORAL ONCE
Qty: 0 | Refills: 0 | Status: COMPLETED | OUTPATIENT
Start: 2019-04-10 | End: 2019-04-10

## 2019-04-10 RX ORDER — CIPROFLOXACIN LACTATE 400MG/40ML
500 VIAL (ML) INTRAVENOUS ONCE
Qty: 0 | Refills: 0 | Status: COMPLETED | OUTPATIENT
Start: 2019-04-10 | End: 2019-04-10

## 2019-04-10 RX ORDER — ACETAMINOPHEN 500 MG
975 TABLET ORAL ONCE
Qty: 0 | Refills: 0 | Status: COMPLETED | OUTPATIENT
Start: 2019-04-10 | End: 2019-04-10

## 2019-04-10 RX ADMIN — Medication 975 MILLIGRAM(S): at 18:30

## 2019-04-10 RX ADMIN — Medication 500 MILLIGRAM(S): at 18:30

## 2019-04-10 RX ADMIN — Medication 100 MILLIGRAM(S): at 18:30

## 2019-04-10 RX ADMIN — Medication 600 MILLIGRAM(S): at 18:29

## 2019-04-10 NOTE — ED PROVIDER NOTE - NSFOLLOWUPINSTRUCTIONS_ED_ALL_ED_FT
Abscess    An abscess is an infected area that contains a collection of pus and debris. It can occur in almost any part of the body and occurs when the tissue gets infection. Symptoms include a painful mass that is red, warm, tender that might break open and HAVE drainage. If your health care provider gave you antibiotics make sure to take the full course and do not stop even if feeling better.     SEEK IMMEDIATE MEDICAL CARE IF YOU HAVE ANY OF THE FOLLOWING SYMPTOMS: chills, fever, muscle aches, or red streaking from the area.    Cellulitis    Cellulitis is a skin infection caused by bacteria. This condition occurs most often in the arms and lower legs but can occur anywhere over the body. Symptoms include redness, swelling, warm skin, tenderness, and chills/fever. If you were prescribed an antibiotic medicine, take it as told by your health care provider. Do not stop taking the antibiotic even if you start to feel better.    SEEK IMMEDIATE MEDICAL CARE IF YOU HAVE ANY OF THE FOLLOWING SYMPTOMS: worsening fever, red streaks coming from affected area, vomiting or diarrhea, or dizziness/lightheadedness.

## 2019-04-10 NOTE — ED PROVIDER NOTE - NSFOLLOWUPCLINICS_GEN_ALL_ED_FT
[facility]  Surgery  [address] [place] [address]  Phone: [phone]  Fax:   Follow Up Time: 1-3 Days    [facility]  Infectious Disease  [address] [place] [id number]  Phone: [phone]  Fax:   Follow Up Time: 1-3 Days

## 2019-04-10 NOTE — ED PROVIDER NOTE - NS ED ROS FT
Constitutional: no fever, chills   Cardiovascular: no chest pain, no sob  Respiratory: no cough, no shortness of breat  Gastrointestinal: no nausea, vomiting or abd pain  Musculoskeletal: no msk pain or injury. no fall or trauma  Integumentary:  + abscess to right forearm with surrounding erythema and swelling  Neurological: no LE weakness or paresthesias

## 2019-04-10 NOTE — ED ADULT NURSE NOTE - CHIEF COMPLAINT QUOTE
Pt BIBA from [facility] for right arm swelling/abscess with hx of IV Drug Abuse. Pt states last time she administered IV drugs was early this morning. Pt denies any suicidal ideations.

## 2019-04-10 NOTE — ED ADULT TRIAGE NOTE - CHIEF COMPLAINT QUOTE
Pt BIBA from [facility] for right arm swelling/abscess with hx of IV Drug Abuse. Pt BIBA from [facility] for right arm swelling/abscess with hx of IV Drug Abuse. Pt states last time she administered IV drugs was early this morning. Pt BIBA from [facility] for right arm swelling/abscess with hx of IV Drug Abuse. Pt states last time she administered IV drugs was early this morning. Pt denies any suicidal ideations.

## 2019-04-10 NOTE — ED ADULT NURSE NOTE - NS ED NOTE ABUSE SUSPICION NEGLECT YN
[facility] Emergency [facility]    [address] [facility] [address]    Phone:  [phone]    Fax:  [phone]       Thank You for choosing us for your health care visit. We are glad to serve you and happy to provide you with this summary of your visit. Please help us to ensure we have accurate records. If you find anything that needs to be changed, please let our staff know as soon as possible.          Your Demographic Information     Patient Name Sex Raquel Calle A Female 1983       Ethnic Group Patient Race    Not of  or  Origin White      Your Visit Details     Date & Time Provider Department    2017 2:05 PM Medardo Saunders NP [facility] Emergency [facility]      Your Upcoming Appointment*(Max 10)     Thursday May 04, 2017 11:40 AM CDT   Obstetric Check with Lynn Mishra DO   [facility] ([facility])    [address] [place] [id number]   [phone]              Your To Do List     Follow-Up    Return if symptoms worsen or fail to improve.      We Ordered or Performed the Following     URINALYSIS WITH MICRO & CULTURE IF INDICATED       Conditions Discussed Today or Order-Related Diagnoses        Comments    Allergic rhinitis, unspecified allergic rhinitis trigger, unspecified rhinitis seasonality    -  Primary     Fatigue, unspecified type         Itchy eyes           Your Vitals Were     BP Pulse Temp Resp Height Weight    115/59 92 97.5 °F (36.4 °C) (Temporal Artery) 12 5' 6\" (1.676 m) 226 lb 10.1 oz (102.8 kg)    LMP SpO2 BMI Smoking Status          2016 (Approximate) 97% 36.58 kg/m2 Former Smoker        Medications Prescribed or Re-Ordered Today     None      Your Current Medications Are        Disp Refills Start End    Prenatal Vit-Fe Fumarate-FA (PRENATAL PO)        Class: Historical Med    Route: Oral    FIBER SELECT GUMMIES PO        Class: Historical Med    Route: Oral    metoCLOPramide (REGLAN) 10 MG tablet 12 tablet 0 12/6/2016     Sig - Route: Take 1 tablet by mouth every 8 hours as needed for Nausea (vomiting). - Oral    ondansetron (ZOFRAN ODT) 8 MG disintegrating tablet 60 tablet 0 12/2/2016     Sig - Route: Take 1 tablet by mouth every 8 hours as needed for Nausea. - Oral    Class: Eprescribe    promethazine (PHENERGAN) 25 MG tablet 30 tablet 0 11/16/2016     Sig - Route: Take 1 tablet by mouth every 6 hours as needed for Nausea. - Oral    Class: Eprescribe    sertraline (ZOLOFT) 100 MG tablet 30 tablet 11 4/20/2016     Sig - Route: Take 1 tablet by mouth daily. - Oral    Class: Eprescribe    Notes to Pharmacy: Disregard previous Rx      Allergies     Bactrim RASH    Nutrasweet Aspartame [Aspartame] GI UPSET      Immunizations History as of 4/26/2017     Name Date    DTaP 3/5/1986, 1/8/1986, 4/18/1984    Hepatitis B Child 4/18/2000    INFLUENZA QUADRIVALENT 11/2/2016, 11/30/2015  8:53 AM    MMR 1/8/1986    Polio, ORAL 1/8/1986, 4/18/1984    RH IMMUNE GLOBULIN 4/14/2017    Tdap 4/13/2017, 12/2/2014      Problem List as of 4/26/2017     Depression with anxiety    Obesity (BMI 30-39.9)    Migraine with aura and without status migrainosus, not intractable      Results of Testing Done Today     URINALYSIS WITH MICRO & CULTURE IF INDICATED      Component Value Standard Range & Units    COLOR YELLOW YELLOW    APPEARANCE HAZY     GLUCOSE(URINE) NEGATIVE NEGATIVE mg/dL    BILIRUBIN NEGATIVE NEGATIVE    KETONES NEGATIVE NEGATIVE mg/dL    SPECIFIC GRAVITY 1.020 1.005 - 1.030    BLOOD NEGATIVE NEGATIVE    pH 6.5 5.0 - 7.0 Units    PROTEIN(URINE) 30 NEGATIVE mg/dL    UROBILINOGEN 0.2 0.0 - 1.0 mg/dL    NITRITE NEGATIVE NEGATIVE    LEUKOCYTE ESTERASE NEGATIVE NEGATIVE    Squamous EPI'S 6 to 10 0 - 5 /hpf    RBC 4 to 5 0 - 3 /hpf    WBC 6 to 10 0 - 5 /hpf    BACTERIA FEW NONE SEEN /hpf    Hyaline Casts NONE SEEN 0 - 5 /lpf    SPECIMEN TYPE URINE, CLEAN  CATCH/MIDSTREAM     MUCOUS PRESENT                           Patient Instructions      Allergic Rhinitis  Allergic rhinitis is an allergic reaction that affects the nose, and often the eyes. It’s often known as nasal allergies. Nasal allergies are often due to things in the environment that are breathed in. Depending what you are sensitive to, nasal allergies may occur only during certain seasons. Or they may occur year round. Common indoor allergens include house dust mites, mold, cockroaches, and pet dander. Outdoor allergens include pollen from trees, grasses, and weeds.   Symptoms include a drippy, stuffy, and itchy nose. They also include sneezing and red and itchy eyes. You may feel tired more often. Severe allergies may also affect your breathing and trigger a condition called asthma.   Tests can be done to see what allergens are affecting you. You may be referred to an allergy specialist for testing and further evaluation.  Home care  The healthcare provider may prescribe medicines to help relieve allergy symptoms.   Ask the provider for advice on how to avoid substances that you are allergic to. Below are a few tips for each type of allergen.  Pet dander:  · Do not have pets with fur and feathers.  · If you cannot avoid having a pet, keep it out of your bedroom and off upholstered furniture.  Pollen:  · When pollen counts are high, keep windows of your car and home closed. If possible, use an air conditioner instead.  · Wear a filter mask when mowing or doing yard work.  House dust mites:  · Wash bedding every week in warm water and detergent and dry on a hot setting.  · Cover the mattress, box spring, and pillows with allergy covers.   · If possible, sleep in a room with no carpet, curtains, or upholstered furniture.  Cockroaches:  · Store food in sealed containers.  · Remove garbage from the home promptly.  · Fix water leaks  Mold:  · Keep humidity low by using a dehumidifier or air conditioner. Keep the  dehumidifier and air conditioner clean and free of mold.  · Clean moldy areas with bleach and water.  In general:  · Vacuum once or twice a week. If possible, use a vacuum with a high-efficiency particulate air (HEPA) filter.  · Do not smoke. Avoid cigarette smoke. Cigarette smoke is an irritant that can make symptoms worse.  Follow-up care  Follow up as advised by the health care provider or our staff. If you were referred to an allergy specialist, make this appointment promptly.  When to seek medical advice  Call your healthcare provider right away if the following occur:  · Coughing or wheezing  · Fever greater than 100.4°F (38°C)  · Continuing symptoms, new symptoms, or worsening symptoms  Call 911 right away if you have:  · Trouble breathing  · Hives (raised red bumps)  · Severe swelling of the face or severe itching of the eyes or mouth  © 5338-1758 [employer]. [address] [place] [address]. All rights reserved. This information is not intended as a substitute for professional medical care. Always follow your healthcare professional's instructions.      Loratadine Oral tablet  What is this medicine?  LORATADINE (arnoldo AT a glendy) is an antihistamine. It helps to relieve sneezing, runny nose, and itchy, watery eyes. This medicine is used to treat the symptoms of allergies. It is also used to treat itchy skin rash and hives.  This medicine may be used for other purposes; ask your health care provider or pharmacist if you have questions.  What should I tell my health care provider before I take this medicine?  They need to know if you have any of these conditions:  · asthma  · kidney disease  · liver disease  · an unusual or allergic reaction to loratadine, other antihistamines, other medicines, foods, dyes, or preservatives  · pregnant or trying to get pregnant  · breast-feeding  How should I use this medicine?  Take this medicine by mouth with a glass of water. Follow the directions on  the label. You may take this medicine with food or on an empty stomach. Take your medicine at regular intervals. Do not take your medicine more often than directed.  Talk to your pediatrician regarding the use of this medicine in children. While this medicine may be used in children as young as 6 years for selected conditions, precautions do apply.  Overdosage: If you think you have taken too much of this medicine contact a poison control center or emergency room at once.  NOTE: This medicine is only for you. Do not share this medicine with others.  What if I miss a dose?  If you miss a dose, take it as soon as you can. If it is almost time for your next dose, take only that dose. Do not take double or extra doses.  What may interact with this medicine?  · other medicines for colds or allergies  This list may not describe all possible interactions. Give your health care provider a list of all the medicines, herbs, non-prescription drugs, or dietary supplements you use. Also tell them if you smoke, drink alcohol, or use illegal drugs. Some items may interact with your medicine.  What should I watch for while using this medicine?  Tell your doctor or healthcare professional if your symptoms do not start to get better or if they get worse.  Your mouth may get dry. Chewing sugarless gum or sucking hard candy, and drinking plenty of water may help. Contact your doctor if the problem does not go away or is severe.  You may get drowsy or dizzy. Do not drive, use machinery, or do anything that needs mental alertness until you know how this medicine affects you. Do not stand or sit up quickly, especially if you are an older patient. This reduces the risk of dizzy or fainting spells.  What side effects may I notice from receiving this medicine?  Side effects that you should report to your doctor or health care professional as soon as possible:  · allergic reactions like skin rash, itching or hives, swelling of the face, lips,  or tongue  · breathing problems  · unusually restless or nervous  Side effects that usually do not require medical attention (report to your doctor or health care professional if they continue or are bothersome):  · drowsiness  · dry or irritated mouth or throat  · headache  This list may not describe all possible side effects. Call your doctor for medical advice about side effects. You may report side effects to FDA at 0-435-SSW-8424.  Where should I keep my medicine?  Keep out of the reach of children.  Store at room temperature between 2 and 30 degrees C (36 and 86 degrees F). Protect from moisture. Throw away any unused medicine after the expiration date.  NOTE:This sheet is a summary. It may not cover all possible information. If you have questions about this medicine, talk to your doctor, pharmacist, or health care provider. Copyright© 2016 [employer]        MEDICATION: FLONASE Nasal Spray  Flonase (generic name: fluticasone) is an anti-inflammatory nasal spray. It is used to treat the nasal symptoms of allergies (\"hay fever\"). Improvement usually begins within five days, although it may require up to three weeks. Notify your doctor if you are not getting better after three weeks of use. This medicine is not effective for an acute flare up of nasal allergy because it does not work immediately. Decongestants or antihistamines may be needed as short term treatment.  DIRECTIONS FOR USE:  Shake the container well, blow the nose to clear out any mucus, tilt the head forward and insert the nozzle. Hold the other nostril closed. Breathe in through the open nostril and spray. Shake the container and repeat in the other nostril. Do not use more sprays than directed, or more often than prescribed.  WHAT TO WATCH FOR:  POSSIBLE SIDE EFFECTS: Mild temporary nasal burning or stinging --> (This is normal). Bleeding from the nose --> (Pinch the nose for five minutes. If this does not stop the bleeding, contact your doctor  or this facility). Headache --> (Contact your doctor if this persists).     ---------- IMPORTANT ----------  MEDICAL CONDITIONS: Before starting this medicine, be sure your doctor knows if you have any of the following conditions:  -- Pregnancy or breast feeding  -- Active tuberculosis, fungal, bacterial or severe viral infection  -- Herpes infection in the eye   -- Recent surgery or injury to the nose  DRUG INTERACTIONS: Before starting this medicine, be sure your doctor knows if you are taking any of the following drugs:  -- Prednisone, other oral steroids  -- Other steroid inhalers or sprays  [NOTE: This information topic may not include all directions, precautions, medical conditions, drug/food interactions and warnings for this drug. Check with your doctor, nurse or pharmacist for any questions that you may have.]  © 3886-5112 [employer], [address] [place] [address], [place] [address]. All rights reserved. This information is not intended as a substitute for professional medical care. Always follow your healthcare professional's instructions.        No

## 2019-04-10 NOTE — ED PROCEDURE NOTE - CPROC ED POST PROC CARE GUIDE1
Keep the cast/splint/dressing clean and dry./Instructed patient/caregiver regarding signs and symptoms of infection./Instructed patient/caregiver to follow-up with primary care physician.

## 2019-04-10 NOTE — ED PROVIDER NOTE - ATTENDING CONTRIBUTION TO CARE
39 y F PMH IVDU, last use IV 3 d ago, last use intranasal today, pw pain to right ulnar aspect forearm at a prior injection site 3 wks ago. Worsening swelling and pain and redness. No fever, chills, palpitations, night sweats, weight loss, back pain, or other concern or complaint.   Exam: NAD, NCAT, HEENT: mmm, EOMI, PERRLA, Neck: supple, nontender, nl ROM, Heart: RRR, no murmur, Lungs: BCTA, no signs of increased WOB, Abd: NTND, no guarding or rebound, no hernia palpated, no CVAT. MSK: right ulnar forearm swelling, redness, fluctuance, 5cm central to 10 cm diameter area of redness and induration. No pain with finger movement and normal strength and sensation and rom distal to swelling. chest, back, and ext nontender, nl rom, no deformity. Neuro: A&Ox3, normal strength, nl sensation throughout, normal speech.   A/P: Focal infection, no sign of systemic illness or systemic infectious process. will assess for fb retained, cover for pseudomonas and MRSA. first dose here. pain control.

## 2019-04-10 NOTE — ED ADULT NURSE NOTE - OBJECTIVE STATEMENT
pt presents with right FA pain , redness and swelling x 1 week worsen over the last 3 days. pt was seen in urgent care and sent to the ED for right FA cellulitis. pt denies fever, or chills at home. pt admit to use IV heroin at least twice a week, last used was this morning. redness and swelling noted to right FA. Strong pulses noted, denies numbness or tingling to right upper extremity.

## 2019-04-10 NOTE — ED PROVIDER NOTE - OBJECTIVE STATEMENT
38 yo female with h/o polysubstance abuse sent to ED from  for abscess to right forearm x 1 week. + h/o IVDA, last injected 3 days ago, last time injecting at site of abscess was 3+ weeks ago. Denies fever, chill, UE weakness or paresthesias, weight loss, night sweats, cough. + h/o prior abscesses

## 2019-04-10 NOTE — ED PROVIDER NOTE - CLINICAL SUMMARY MEDICAL DECISION MAKING FREE TEXT BOX
pw abscess 2/2 IVDU at an injection site. Evaluated and negative for foreign body. I&D performed, residual surrounding cellulitis started on MRSA and pseudomonal coverage w first dose in ED, care instructions and return precautions. Pt not interested in quitting nor in the suboxone program. Still using intranasally. Patient to be discharged from ED. Any available test results were discussed with patient and/or family. Verbal instructions given, including instructions to return to ED immediately for any new, worsening, or concerning symptoms. Patient endorsed understanding. Written discharge instructions additionally given, including follow-up plan.

## 2019-04-10 NOTE — ED PROVIDER NOTE - PHYSICAL EXAMINATION
GENERAL:  well appearing, non-toxic female in no acute distress  SKIN: skin warm, pink and dry. MMM. abscess to right forearm with surrounding swelling, erythema, and increased warmth. + central fluctuance. cap refill < 2 sec   PULM: CTAB. Normal respiratory effort. No respiratory distress. No wheezes, stridor, rales or rhonchi. No retractions  CV: RRR, no M/R/G.   MSK: FROM of right wrist and elbow, no bony tenderness. No edema, erythema, cyanosis. radial pulses equal and intact bilaterally.   NEURO: A+Ox3, no sensory/motor deficits

## 2019-04-11 PROBLEM — F41.9 ANXIETY DISORDER, UNSPECIFIED: Chronic | Status: ACTIVE | Noted: 2018-08-24

## 2019-04-11 PROBLEM — F31.9 BIPOLAR DISORDER, UNSPECIFIED: Chronic | Status: ACTIVE | Noted: 2018-08-24

## 2019-04-11 PROBLEM — T40.1X1A POISONING BY HEROIN, ACCIDENTAL (UNINTENTIONAL), INITIAL ENCOUNTER: Chronic | Status: ACTIVE | Noted: 2018-08-24

## 2019-04-11 PROBLEM — F11.20 OPIOID DEPENDENCE, UNCOMPLICATED: Chronic | Status: ACTIVE | Noted: 2018-08-24

## 2019-04-11 PROBLEM — F14.20 COCAINE DEPENDENCE, UNCOMPLICATED: Chronic | Status: ACTIVE | Noted: 2018-08-24

## 2019-04-11 PROBLEM — F17.200 NICOTINE DEPENDENCE, UNSPECIFIED, UNCOMPLICATED: Chronic | Status: ACTIVE | Noted: 2018-08-24

## 2019-04-11 PROBLEM — E66.9 OBESITY, UNSPECIFIED: Chronic | Status: ACTIVE | Noted: 2018-08-24

## 2019-04-11 PROBLEM — F43.10 POST-TRAUMATIC STRESS DISORDER, UNSPECIFIED: Chronic | Status: ACTIVE | Noted: 2018-08-24

## 2019-04-11 PROBLEM — Z86.19 PERSONAL HISTORY OF OTHER INFECTIOUS AND PARASITIC DISEASES: Chronic | Status: ACTIVE | Noted: 2018-08-24

## 2019-04-16 ENCOUNTER — APPOINTMENT (OUTPATIENT)
Dept: SURGERY | Facility: CLINIC | Age: 39
End: 2019-04-16
Payer: MEDICAID

## 2019-04-16 VITALS
DIASTOLIC BLOOD PRESSURE: 72 MMHG | BODY MASS INDEX: 28.56 KG/M2 | WEIGHT: 182 LBS | SYSTOLIC BLOOD PRESSURE: 120 MMHG | HEIGHT: 67 IN

## 2019-04-16 DIAGNOSIS — Z78.9 OTHER SPECIFIED HEALTH STATUS: ICD-10-CM

## 2019-04-16 DIAGNOSIS — F17.210 NICOTINE DEPENDENCE, CIGARETTES, UNCOMPLICATED: ICD-10-CM

## 2019-04-16 DIAGNOSIS — Z87.898 PERSONAL HISTORY OF OTHER SPECIFIED CONDITIONS: ICD-10-CM

## 2019-04-16 DIAGNOSIS — O99.340 OTHER MENTAL DISORDERS COMPLICATING PREGNANCY, UNSPECIFIED TRIMESTER: ICD-10-CM

## 2019-04-16 DIAGNOSIS — Z86.59 PERSONAL HISTORY OF OTHER MENTAL AND BEHAVIORAL DISORDERS: ICD-10-CM

## 2019-04-16 DIAGNOSIS — F43.10 POST-TRAUMATIC STRESS DISORDER, UNSPECIFIED: ICD-10-CM

## 2019-04-16 DIAGNOSIS — Z98.890 OTHER SPECIFIED POSTPROCEDURAL STATES: ICD-10-CM

## 2019-04-16 DIAGNOSIS — Z3A.10 10 WEEKS GESTATION OF PREGNANCY: ICD-10-CM

## 2019-04-16 DIAGNOSIS — Z00.00 ENCOUNTER FOR GENERAL ADULT MEDICAL EXAMINATION W/OUT ABNORMAL FINDINGS: ICD-10-CM

## 2019-04-16 DIAGNOSIS — F31.9 OTHER MENTAL DISORDERS COMPLICATING PREGNANCY, UNSPECIFIED TRIMESTER: ICD-10-CM

## 2019-04-16 DIAGNOSIS — F41.9 ANXIETY DISORDER, UNSPECIFIED: ICD-10-CM

## 2019-04-16 PROCEDURE — 99212 OFFICE O/P EST SF 10 MIN: CPT

## 2019-04-16 NOTE — PHYSICAL EXAM
[JVD] : no jugular venous distention  [Abdominal Masses] : No abdominal masses [Abdomen Tenderness] : ~T ~M No abdominal tenderness [No HSM] : no hepatosplenomegaly [Tender] : was nontender [No Rash or Lesion] : No rash or lesion [Alert] : alert [Oriented to Person] : oriented to person [Oriented to Place] : oriented to place [Oriented to Time] : oriented to time [Calm] : calm [de-identified] : doing well , denies fever or chills i [de-identified] : MENDEZ [de-identified] : right forearm small incision , healing with no drainage, fluctuance or cellulitis

## 2019-04-16 NOTE — REASON FOR VISIT
[Initial Evaluation] : an initial evaluation [FreeTextEntry1] : referred from the ED for evaluation after being treated for right forearm abscess due to IVDA , treated with antibiotics u

## 2019-04-26 ENCOUNTER — INPATIENT (INPATIENT)
Facility: HOSPITAL | Age: 39
LOS: 4 days | Discharge: NURSING, REHAB OR LONG-TERM CARE | End: 2019-05-01
Attending: INTERNAL MEDICINE | Admitting: INTERNAL MEDICINE
Payer: MEDICAID

## 2019-04-26 VITALS
HEIGHT: 64 IN | WEIGHT: 179.9 LBS | DIASTOLIC BLOOD PRESSURE: 74 MMHG | SYSTOLIC BLOOD PRESSURE: 140 MMHG | HEART RATE: 67 BPM | TEMPERATURE: 97 F | RESPIRATION RATE: 16 BRPM

## 2019-04-26 DIAGNOSIS — E66.9 OBESITY, UNSPECIFIED: ICD-10-CM

## 2019-04-26 DIAGNOSIS — F17.200 NICOTINE DEPENDENCE, UNSPECIFIED, UNCOMPLICATED: ICD-10-CM

## 2019-04-26 DIAGNOSIS — F13.20 SEDATIVE, HYPNOTIC OR ANXIOLYTIC DEPENDENCE, UNCOMPLICATED: ICD-10-CM

## 2019-04-26 DIAGNOSIS — F43.10 POST-TRAUMATIC STRESS DISORDER, UNSPECIFIED: ICD-10-CM

## 2019-04-26 DIAGNOSIS — E78.00 PURE HYPERCHOLESTEROLEMIA, UNSPECIFIED: ICD-10-CM

## 2019-04-26 DIAGNOSIS — F31.9 BIPOLAR DISORDER, UNSPECIFIED: ICD-10-CM

## 2019-04-26 DIAGNOSIS — F42.9 OBSESSIVE-COMPULSIVE DISORDER, UNSPECIFIED: ICD-10-CM

## 2019-04-26 DIAGNOSIS — F14.20 COCAINE DEPENDENCE, UNCOMPLICATED: ICD-10-CM

## 2019-04-26 DIAGNOSIS — F11.20 OPIOID DEPENDENCE, UNCOMPLICATED: ICD-10-CM

## 2019-04-26 DIAGNOSIS — Z86.19 PERSONAL HISTORY OF OTHER INFECTIOUS AND PARASITIC DISEASES: ICD-10-CM

## 2019-04-26 DIAGNOSIS — Z98.890 OTHER SPECIFIED POSTPROCEDURAL STATES: Chronic | ICD-10-CM

## 2019-04-26 LAB
ALBUMIN SERPL ELPH-MCNC: 4.5 G/DL — SIGNIFICANT CHANGE UP (ref 3.5–5.2)
ALP SERPL-CCNC: 83 U/L — SIGNIFICANT CHANGE UP (ref 30–115)
ALT FLD-CCNC: 31 U/L — SIGNIFICANT CHANGE UP (ref 0–41)
ANION GAP SERPL CALC-SCNC: 11 MMOL/L — SIGNIFICANT CHANGE UP (ref 7–14)
APPEARANCE UR: CLEAR — SIGNIFICANT CHANGE UP
AST SERPL-CCNC: 24 U/L — SIGNIFICANT CHANGE UP (ref 0–41)
BASOPHILS # BLD AUTO: 0.04 K/UL — SIGNIFICANT CHANGE UP (ref 0–0.2)
BASOPHILS NFR BLD AUTO: 0.5 % — SIGNIFICANT CHANGE UP (ref 0–1)
BILIRUB SERPL-MCNC: 0.2 MG/DL — SIGNIFICANT CHANGE UP (ref 0.2–1.2)
BILIRUB UR-MCNC: NEGATIVE — SIGNIFICANT CHANGE UP
BUN SERPL-MCNC: 16 MG/DL — SIGNIFICANT CHANGE UP (ref 10–20)
CALCIUM SERPL-MCNC: 9.4 MG/DL — SIGNIFICANT CHANGE UP (ref 8.5–10.1)
CHLORIDE SERPL-SCNC: 104 MMOL/L — SIGNIFICANT CHANGE UP (ref 98–110)
CHOLEST SERPL-MCNC: 229 MG/DL — HIGH (ref 100–200)
CO2 SERPL-SCNC: 27 MMOL/L — SIGNIFICANT CHANGE UP (ref 17–32)
COLOR SPEC: YELLOW — SIGNIFICANT CHANGE UP
COMMENT - URINE: SIGNIFICANT CHANGE UP
CREAT SERPL-MCNC: 1.1 MG/DL — SIGNIFICANT CHANGE UP (ref 0.7–1.5)
DIFF PNL FLD: NEGATIVE — SIGNIFICANT CHANGE UP
EOSINOPHIL # BLD AUTO: 0.16 K/UL — SIGNIFICANT CHANGE UP (ref 0–0.7)
EOSINOPHIL NFR BLD AUTO: 2 % — SIGNIFICANT CHANGE UP (ref 0–8)
EPI CELLS # UR: ABNORMAL /HPF
ESTIMATED AVERAGE GLUCOSE: 114 MG/DL — SIGNIFICANT CHANGE UP (ref 68–114)
GGT SERPL-CCNC: 44 U/L — HIGH (ref 1–40)
GLUCOSE SERPL-MCNC: 120 MG/DL — HIGH (ref 70–99)
GLUCOSE UR QL: NEGATIVE MG/DL — SIGNIFICANT CHANGE UP
HBA1C BLD-MCNC: 5.6 % — SIGNIFICANT CHANGE UP (ref 4–5.6)
HCG UR QL: NEGATIVE — SIGNIFICANT CHANGE UP
HCT VFR BLD CALC: 43.1 % — SIGNIFICANT CHANGE UP (ref 37–47)
HDLC SERPL-MCNC: 55 MG/DL — SIGNIFICANT CHANGE UP
HGB BLD-MCNC: 14.3 G/DL — SIGNIFICANT CHANGE UP (ref 12–16)
IMM GRANULOCYTES NFR BLD AUTO: 0.9 % — HIGH (ref 0.1–0.3)
KETONES UR-MCNC: NEGATIVE — SIGNIFICANT CHANGE UP
LEUKOCYTE ESTERASE UR-ACNC: NEGATIVE — SIGNIFICANT CHANGE UP
LIPID PNL WITH DIRECT LDL SERPL: 177 MG/DL — HIGH (ref 4–129)
LYMPHOCYTES # BLD AUTO: 2.71 K/UL — SIGNIFICANT CHANGE UP (ref 1.2–3.4)
LYMPHOCYTES # BLD AUTO: 34.4 % — SIGNIFICANT CHANGE UP (ref 20.5–51.1)
MAGNESIUM SERPL-MCNC: 2.3 MG/DL — SIGNIFICANT CHANGE UP (ref 1.8–2.4)
MCHC RBC-ENTMCNC: 29.2 PG — SIGNIFICANT CHANGE UP (ref 27–31)
MCHC RBC-ENTMCNC: 33.2 G/DL — SIGNIFICANT CHANGE UP (ref 32–37)
MCV RBC AUTO: 88.1 FL — SIGNIFICANT CHANGE UP (ref 81–99)
MONOCYTES # BLD AUTO: 0.38 K/UL — SIGNIFICANT CHANGE UP (ref 0.1–0.6)
MONOCYTES NFR BLD AUTO: 4.8 % — SIGNIFICANT CHANGE UP (ref 1.7–9.3)
NEUTROPHILS # BLD AUTO: 4.51 K/UL — SIGNIFICANT CHANGE UP (ref 1.4–6.5)
NEUTROPHILS NFR BLD AUTO: 57.4 % — SIGNIFICANT CHANGE UP (ref 42.2–75.2)
NITRITE UR-MCNC: NEGATIVE — SIGNIFICANT CHANGE UP
NRBC # BLD: 0 /100 WBCS — SIGNIFICANT CHANGE UP (ref 0–0)
PH UR: 5.5 — SIGNIFICANT CHANGE UP (ref 5–8)
PLATELET # BLD AUTO: 243 K/UL — SIGNIFICANT CHANGE UP (ref 130–400)
POTASSIUM SERPL-MCNC: 4.4 MMOL/L — SIGNIFICANT CHANGE UP (ref 3.5–5)
POTASSIUM SERPL-SCNC: 4.4 MMOL/L — SIGNIFICANT CHANGE UP (ref 3.5–5)
PROT SERPL-MCNC: 8.2 G/DL — HIGH (ref 6–8)
PROT UR-MCNC: ABNORMAL MG/DL
RBC # BLD: 4.89 M/UL — SIGNIFICANT CHANGE UP (ref 4.2–5.4)
RBC # FLD: 12.9 % — SIGNIFICANT CHANGE UP (ref 11.5–14.5)
RBC CASTS # UR COMP ASSIST: NEGATIVE — SIGNIFICANT CHANGE UP
SODIUM SERPL-SCNC: 142 MMOL/L — SIGNIFICANT CHANGE UP (ref 135–146)
SP GR SPEC: >=1.03 (ref 1.01–1.03)
TOTAL CHOLESTEROL/HDL RATIO MEASUREMENT: 4.2 RATIO — SIGNIFICANT CHANGE UP (ref 4–5.5)
TRIGL SERPL-MCNC: 134 MG/DL — SIGNIFICANT CHANGE UP (ref 10–149)
UROBILINOGEN FLD QL: 0.2 MG/DL — SIGNIFICANT CHANGE UP (ref 0.2–0.2)
WBC # BLD: 7.87 K/UL — SIGNIFICANT CHANGE UP (ref 4.8–10.8)
WBC # FLD AUTO: 7.87 K/UL — SIGNIFICANT CHANGE UP (ref 4.8–10.8)
WBC UR QL: NEGATIVE — SIGNIFICANT CHANGE UP

## 2019-04-26 RX ORDER — PHENOBARBITAL 60 MG
32.4 TABLET ORAL EVERY 12 HOURS
Qty: 0 | Refills: 0 | Status: DISCONTINUED | OUTPATIENT
Start: 2019-04-30 | End: 2019-05-01

## 2019-04-26 RX ORDER — IBUPROFEN 200 MG
400 TABLET ORAL EVERY 6 HOURS
Qty: 0 | Refills: 0 | Status: DISCONTINUED | OUTPATIENT
Start: 2019-04-26 | End: 2019-05-01

## 2019-04-26 RX ORDER — HYDROXYZINE HCL 10 MG
50 TABLET ORAL
Qty: 0 | Refills: 0 | COMMUNITY

## 2019-04-26 RX ORDER — METHOCARBAMOL 500 MG/1
500 TABLET, FILM COATED ORAL EVERY 6 HOURS
Qty: 0 | Refills: 0 | Status: DISCONTINUED | OUTPATIENT
Start: 2019-04-26 | End: 2019-05-01

## 2019-04-26 RX ORDER — GUAIFENESIN/DEXTROMETHORPHAN 600MG-30MG
5 TABLET, EXTENDED RELEASE 12 HR ORAL EVERY 4 HOURS
Qty: 0 | Refills: 0 | Status: DISCONTINUED | OUTPATIENT
Start: 2019-04-26 | End: 2019-05-01

## 2019-04-26 RX ORDER — BUPRENORPHINE AND NALOXONE 2; .5 MG/1; MG/1
1 TABLET SUBLINGUAL
Qty: 0 | Refills: 0 | Status: DISCONTINUED | OUTPATIENT
Start: 2019-04-27 | End: 2019-04-28

## 2019-04-26 RX ORDER — NICOTINE POLACRILEX 2 MG
1 GUM BUCCAL DAILY
Qty: 0 | Refills: 0 | Status: DISCONTINUED | OUTPATIENT
Start: 2019-04-26 | End: 2019-05-01

## 2019-04-26 RX ORDER — PHENOBARBITAL 60 MG
32.4 TABLET ORAL EVERY 4 HOURS
Qty: 0 | Refills: 0 | Status: DISCONTINUED | OUTPATIENT
Start: 2019-04-26 | End: 2019-05-01

## 2019-04-26 RX ORDER — QUETIAPINE FUMARATE 200 MG/1
25 TABLET, FILM COATED ORAL DAILY
Qty: 0 | Refills: 0 | Status: DISCONTINUED | OUTPATIENT
Start: 2019-04-26 | End: 2019-05-01

## 2019-04-26 RX ORDER — GABAPENTIN 400 MG/1
600 CAPSULE ORAL THREE TIMES A DAY
Qty: 0 | Refills: 0 | Status: DISCONTINUED | OUTPATIENT
Start: 2019-04-26 | End: 2019-05-01

## 2019-04-26 RX ORDER — QUETIAPINE FUMARATE 200 MG/1
100 TABLET, FILM COATED ORAL AT BEDTIME
Qty: 0 | Refills: 0 | Status: DISCONTINUED | OUTPATIENT
Start: 2019-04-26 | End: 2019-05-01

## 2019-04-26 RX ORDER — PHENOBARBITAL 60 MG
48.6 TABLET ORAL EVERY 6 HOURS
Qty: 0 | Refills: 0 | Status: DISCONTINUED | OUTPATIENT
Start: 2019-04-27 | End: 2019-04-28

## 2019-04-26 RX ORDER — ACETAMINOPHEN 500 MG
650 TABLET ORAL EVERY 4 HOURS
Qty: 0 | Refills: 0 | Status: DISCONTINUED | OUTPATIENT
Start: 2019-04-26 | End: 2019-05-01

## 2019-04-26 RX ORDER — PSEUDOEPHEDRINE HCL 30 MG
60 TABLET ORAL EVERY 6 HOURS
Qty: 0 | Refills: 0 | Status: DISCONTINUED | OUTPATIENT
Start: 2019-04-26 | End: 2019-05-01

## 2019-04-26 RX ORDER — HYDROXYZINE HCL 10 MG
50 TABLET ORAL
Qty: 0 | Refills: 0 | Status: DISCONTINUED | OUTPATIENT
Start: 2019-04-26 | End: 2019-05-01

## 2019-04-26 RX ORDER — MAGNESIUM HYDROXIDE 400 MG/1
30 TABLET, CHEWABLE ORAL ONCE
Qty: 0 | Refills: 0 | Status: DISCONTINUED | OUTPATIENT
Start: 2019-04-26 | End: 2019-05-01

## 2019-04-26 RX ORDER — PHENOBARBITAL 60 MG
TABLET ORAL
Qty: 0 | Refills: 0 | Status: COMPLETED | OUTPATIENT
Start: 2019-04-26 | End: 2019-05-01

## 2019-04-26 RX ORDER — ESCITALOPRAM OXALATE 10 MG/1
20 TABLET, FILM COATED ORAL DAILY
Qty: 0 | Refills: 0 | Status: DISCONTINUED | OUTPATIENT
Start: 2019-04-26 | End: 2019-05-01

## 2019-04-26 RX ORDER — PHENOBARBITAL 60 MG
64.8 TABLET ORAL EVERY 6 HOURS
Qty: 0 | Refills: 0 | Status: DISCONTINUED | OUTPATIENT
Start: 2019-04-26 | End: 2019-04-27

## 2019-04-26 RX ORDER — MULTIVIT-MIN/FERROUS GLUCONATE 9 MG/15 ML
1 LIQUID (ML) ORAL DAILY
Qty: 0 | Refills: 0 | Status: DISCONTINUED | OUTPATIENT
Start: 2019-04-26 | End: 2019-05-01

## 2019-04-26 RX ORDER — QUETIAPINE FUMARATE 200 MG/1
25 TABLET, FILM COATED ORAL
Qty: 0 | Refills: 0 | COMMUNITY

## 2019-04-26 RX ORDER — PHENOBARBITAL 60 MG
32.4 TABLET ORAL EVERY 6 HOURS
Qty: 0 | Refills: 0 | Status: DISCONTINUED | OUTPATIENT
Start: 2019-04-28 | End: 2019-04-28

## 2019-04-26 RX ADMIN — GABAPENTIN 600 MILLIGRAM(S): 400 CAPSULE ORAL at 20:22

## 2019-04-26 RX ADMIN — QUETIAPINE FUMARATE 100 MILLIGRAM(S): 200 TABLET, FILM COATED ORAL at 20:22

## 2019-04-26 RX ADMIN — Medication 50 MILLIGRAM(S): at 20:22

## 2019-04-26 RX ADMIN — Medication 400 MILLIGRAM(S): at 15:41

## 2019-04-26 RX ADMIN — Medication 64.8 MILLIGRAM(S): at 17:53

## 2019-04-26 NOTE — H&P ADULT - NSICDXPASTMEDICALHX_GEN_ALL_CORE_FT
PAST MEDICAL HISTORY:  Anxiety and depression     Bipolar disorder     Cocaine dependence     Heroin overdose     History of HPV infection     HPV in female     Hypercholesterolemia     Nicotine dependence     Obesity     Opiate dependence, continuous     PTSD (post-traumatic stress disorder)

## 2019-04-26 NOTE — H&P ADULT - PROBLEM SELECTOR PLAN 2
Check Urine Toxicology  Continue Suboxone 8/2 mg SL BID  Monitor VS and withdrawal symptoms  Prn Medication  Dr. Bundy was Notified

## 2019-04-26 NOTE — H&P ADULT - ATTENDING COMMENTS
Patient interviewed and examined.    Chart reviewed.    PA's H&P noted and modified, as appropriate.    Case discussed on team rounds    Following is my summary of the case.    Admitted for detox: from ____ED, _x__Intake, ____Med/Surg Floor    Alcohol____   Opioid___x__  Benzo__x_ Other_____    Substance amount, duration of use, last usage, and prior attempts at detox or rehabs, are outlined above in the H&P and discussed with patient.    Associated withdrawal symptoms presents.  Comorbid conditions noted. Chronic and Stable.    Past Medical Hx, Psych Hx, family Hx, Social Hx from H&P reviewed and NO changes.    Old medical record and medication Hx. Reviewed    Following items reviewed and addressed:  1. labs  2. EKG  3. Imaging from PACs module    Examination: no change from PA's exam.    Place on following protocol  _____Medically Managed  __X__Medically Supervised    Ciwa_____Librium taper____Ativan taper___Methadone taper___ Phenobarb taper____ Suboxone _x___MMTP____    Narcan Kit Offered    Psych Consult __X__N/A  ___Ordered    Physical Therapy  ___X N/A   ___  Ordered    Aftercare disposition to be addressed by counselors.    Estimated length of stay 3-5 days.

## 2019-04-26 NOTE — H&P ADULT - PROBLEM SELECTOR PLAN 1
Check Urine Toxicology  Phenobarbital Taper Protocol  Monitor VS and withdrawal symptoms  PRN Medications  Dr. Bundy was Notified

## 2019-04-26 NOTE — H&P ADULT - NSHPPHYSICALEXAM_GEN_ALL_CORE
-  Vital Signs:      Temp:  98.2    Pulse:  74     RR:  14     BP:  108/74                       Constitutional: anxious A&Ox3, WD/WN,oversedated  Eyes: PERRLA  Respiratory: +air entry, no rales, no rhonchi, no wheezes  Cardiovascular: +S1 and S2,RRR  Gastrointestinal: +BS, soft, non-tender, not distended, No CVAT  Extremities: no cyanosis, no edema, no calf tenderness,   Vascular: +dorsal pedis and radial pulses, no extremity cyanosis  Neurological: sensation intact, ROM equal B/L, CN II-XII intact, Gait: steady  Skin: no rashes, normal turgor, No track marks   No Decubiti present  No IV lines present  Rectal/Breasts Exam: Deferred

## 2019-04-26 NOTE — H&P ADULT - PROBLEM SELECTOR PLAN 10
Observation  Psych. Consult Prn  Continue  Lexapro 20 mg po daily  Neurontin 600 mg po TID  Seroquel 100 mg po Q Hs,and 25 Mg po daily  Vistaril 50 mg po BID

## 2019-04-26 NOTE — H&P ADULT - HISTORY OF PRESENT ILLNESS
This is   a 40 Y/O  Female ,private Patient of [facility]  pt with Hx of Continuous Opiate, Benzo, and Crack Dependency, non-compliant with Suboxone, and abusing her prescribed  Klonopin.  Prior hx of 5 Detoxes in the past, last Detox at [facility] 2018 – 2018,was Clean for one month, then relapsed the past 8 months. As per Dr. Bundy: Continue Suboxone 8/2 mg bid, Detox Klonopin with Phenobarbital, then patient needs to go to Rehab after Completing  of Detox.     DRUG	AGE OF ONSET	ROUTE	FREQ	AMOUNT	LAST USE	LENGTH OF CURRENT USE	  HEROIN	34 Y/O	IN/IV	Daily	8-10 Bgas	19 2 Bags	8 Months	  SUBOXONE 8/2 mg	39 Y/O	SL	Daily	8/2 mg BID	19  16 mg	8 Months	  KLONOPIN 1 mg	40 Y/O	PO	Daily	4-6 mg daily	19 1 mg	8 Months	  CRACK	34 Y/O	Smoking	Daily	$ 40	   	8 Months	  							    I-Stop:  Was the prescriber informed by Intake clinician: Dr. Bundy was notified  2019	buprenorphine-naloxone 8-2 mg sl film 	60	30	Vineet Bundy MD	Insurance	[facility]	  2019	clonazepam 1 mg tablet 	90	30	Vineet Bundy MD	[facility]	  2019	buprenorphine-naloxone 8-2 mg sl film 	60	30	Vineet Bundy MD			    Benzo W/D HX:    (+) Hot &cold Flashes, (+) Anxiety ,(+)Insomnia,(+)Body aches,(+)Poor Appetite, (-)  Seizure   Opiate W/D  HX:  (+)Myalgia,(+)N/V/D,(+) Diaphoresis,(+)   Anxiety,(+)Insomnia,(+)Piloerection,(+)Lacrimation         Hx of  Overdose :      Yes  ( about 4 Times)                                       last Overdose: 2 Weeks PTA  Hx x of Withdrawal Seizures:    NO                   MMTP :  NO        OBGYN Hx:   LMP: 2019              P:   0          S/P  X 2 and Miscarriage X 2  last Pap - Smear  :   2017  ( HPV)            Last Mammogram: 2017   : WNL    Psy hx: Bipolar D/O,Anxiety & PTSD,  Compliant with Lexapro, Neurontin, Seroquel, and  Vistaril                Denies any S/H Ideation or A/V Hallucination  Screening history	Last tested	Result	History of treatment	  HIV	2018	NEG	N/A	  Hepatitis C	2018	NEG	N/A	  Quantiferon GOLD TB test	2018	NEG	N/A	    Immunization	Not Received	Unknown	Received	Date Received 	  Influenza			X	10/2018	  Pneumococcus		X			  Tetanus			X	>10 years	  Others					  					  Patient  denied  any Chest Pain, SOB, Abdominal Pain, HA, Blurry Vision, Bleeding ,or Dysuria

## 2019-04-26 NOTE — H&P ADULT - ASSESSMENT
This is   a 38 Y/O  Female ,private Patient of [facility]  pt with Hx of Continuous Opiate, Benzo, and Crack Dependency, non-compliant with Suboxone, and abusing her prescribed  Klonopin.  Prior hx of 5 Detoxes in the past, last Detox at [facility] 8/24/2018 – 8/28/2018,was Clean for one month, then relapsed the past 8 months. As per Dr. Bundy: Continue Suboxone 8/2 mg bid, Detox Klonopin with Phenobarbital, then patient needs to go to Rehab after Completing  of Detox.

## 2019-04-26 NOTE — H&P ADULT - NSICDXPASTSURGICALHX_GEN_ALL_CORE_FT
PAST SURGICAL HISTORY:  H/O bilateral breast reduction surgery in 2014    H/O MEGAN     S/P D&C (status post dilation and curettage) 6/2018

## 2019-04-27 LAB
HAV IGM SER-ACNC: SIGNIFICANT CHANGE UP
HBV CORE IGM SER-ACNC: SIGNIFICANT CHANGE UP
HBV SURFACE AG SER-ACNC: SIGNIFICANT CHANGE UP
HCV AB S/CO SERPL IA: 0.1 S/CO — SIGNIFICANT CHANGE UP (ref 0–0.99)
HCV AB SERPL-IMP: SIGNIFICANT CHANGE UP
HIV 1+2 AB+HIV1 P24 AG SERPL QL IA: SIGNIFICANT CHANGE UP
T PALLIDUM AB TITR SER: NEGATIVE — SIGNIFICANT CHANGE UP

## 2019-04-27 RX ADMIN — Medication 48.6 MILLIGRAM(S): at 06:45

## 2019-04-27 RX ADMIN — GABAPENTIN 600 MILLIGRAM(S): 400 CAPSULE ORAL at 21:25

## 2019-04-27 RX ADMIN — QUETIAPINE FUMARATE 100 MILLIGRAM(S): 200 TABLET, FILM COATED ORAL at 21:25

## 2019-04-27 RX ADMIN — GABAPENTIN 600 MILLIGRAM(S): 400 CAPSULE ORAL at 09:58

## 2019-04-27 RX ADMIN — ESCITALOPRAM OXALATE 20 MILLIGRAM(S): 10 TABLET, FILM COATED ORAL at 09:58

## 2019-04-27 RX ADMIN — Medication 64.8 MILLIGRAM(S): at 00:08

## 2019-04-27 RX ADMIN — Medication 50 MILLIGRAM(S): at 21:25

## 2019-04-27 RX ADMIN — GABAPENTIN 600 MILLIGRAM(S): 400 CAPSULE ORAL at 12:39

## 2019-04-27 RX ADMIN — Medication 1 TABLET(S): at 09:58

## 2019-04-27 RX ADMIN — Medication 1 PATCH: at 18:25

## 2019-04-27 RX ADMIN — Medication 50 MILLIGRAM(S): at 09:58

## 2019-04-27 RX ADMIN — Medication 1 PATCH: at 09:58

## 2019-04-27 RX ADMIN — Medication 48.6 MILLIGRAM(S): at 12:39

## 2019-04-27 RX ADMIN — QUETIAPINE FUMARATE 25 MILLIGRAM(S): 200 TABLET, FILM COATED ORAL at 09:58

## 2019-04-27 NOTE — CHART NOTE - NSCHARTNOTEFT_GEN_A_CORE
Subsequent Inpatient Encounter                                       Detox Unit    MARQUITA CUMMINGS   39y   Female      Chief Complaint:    Follow up for Polysubstance  Dependency    HPI:     I reviewed previous notes. No Change, except if noted below.             Detail:_    ROS:   I reviewed with patient.  No changes from previous notes except if noted below.             Detail: _    PFSH I reviewed with patient. No changes from previous notes except if noted below.             Detail_    Medication reconciliation performed.    MEDICATIONS  (STANDING):  buprenorphine 8 mG/naloxone 2 mG SL  Tablet 1 Tablet(s) SubLingual <User Schedule>  escitalopram 20 milliGRAM(s) Oral daily  gabapentin 600 milliGRAM(s) Oral three times a day  hydrOXYzine hydrochloride 50 milliGRAM(s) Oral two times a day  multivitamin/minerals 1 Tablet(s) Oral daily  nicotine - 21 mG/24Hr(s) Patch 1 Patch Transdermal daily  PHENobarbital   Oral   PHENobarbital 48.6 milliGRAM(s) Oral every 6 hours  QUEtiapine 100 milliGRAM(s) Oral at bedtime  QUEtiapine 25 milliGRAM(s) Oral daily      MEDICATIONS  (PRN):  acetaminophen   Tablet .. 650 milliGRAM(s) Oral every 4 hours PRN Temp greater or equal to 38.5C (101.3F)  aluminum hydroxide/magnesium hydroxide/simethicone Suspension 30 milliLiter(s) Oral every 6 hours PRN Heartburn  bismuth subsalicylate Liquid 30 milliLiter(s) Oral every 6 hours PRN Diarrhea  cloNIDine 0.1 milliGRAM(s) Oral every 8 hours PRN Blood Pressure GREATER THAN 140/90 mmHG  cloNIDine 0.1 milliGRAM(s) Oral every 8 hours PRN opiate withdrawal  guaiFENesin/dextromethorphan  Syrup 5 milliLiter(s) Oral every 4 hours PRN Cough  ibuprofen  Tablet. 400 milliGRAM(s) Oral every 6 hours PRN Mild Pain (1 - 3)  magnesium hydroxide Suspension 30 milliLiter(s) Oral once PRN Constipation  methocarbamol 500 milliGRAM(s) Oral every 6 hours PRN muscle pain  PHENobarbital 32.4 milliGRAM(s) Oral every 4 hours PRN Withdrawal  pseudoephedrine 60 milliGRAM(s) Oral every 6 hours PRN Rhinitis  trimethobenzamide 300 milliGRAM(s) Oral every 6 hours PRN Nausea and/or Vomiting  trimethobenzamide Injectable 200 milliGRAM(s) IntraMuscular every 6 hours PRN Nausea and/or Vomiting      T(C): 35.8 (04-27-19 @ 06:00), Max: 36.9 (04-27-19 @ 00:00)  HR: 65 (04-27-19 @ 06:00) (65 - 77)  BP: 103/56 (04-27-19 @ 06:00) (91/53 - 140/74)  RR: 14 (04-27-19 @ 06:00) (14 - 18)  SpO2: --    PHYSICAL EXAM:      Constitutional: NAD, A&O x3    Eyes: PERRLA, no conjuctivitis    Respiratory: +air entry    Cardiovascular: +S1 and S2, regular rate and rhythm    Gastrointestinal: +BS, soft, non-tender, not distended    Extremities:  no edema, no calf tenderness    Skin: no rashes, normal turgor                            14.3   7.87  )-----------( 243      ( 26 Apr 2019 11:50 )             43.1   04-26    142  |  104  |  16  ----------------------------<  120<H>  4.4   |  27  |  1.1    Ca    9.4      26 Apr 2019 11:50  Mg     2.3     04-26    TPro  8.2<H>  /  Alb  4.5  /  TBili  0.2  /  DBili  x   /  AST  24  /  ALT  31  /  AlkPhos  83  04-26    Magnesium, Serum: 2.3 mg/dL (04-26-19 @ 11:50)  Hemoglobin A1C, Whole Blood: 5.6 % (04-26-19 @ 11:50)  Treponema Pallidum Antibody Interpretation: Negative (04-26-19 @ 11:50)  Hepatitis B Surface Antigen: Nonreact (04-26-19 @ 11:50)  Hepatitis C Virus S/CO Ratio: 0.10 S/CO (04-26-19 @ 11:50)    Hepatitis C Virus Interpretation: Nonreact (04-26-19 @ 11:50)      Urinalysis Basic - ( 26 Apr 2019 13:30 )    Color: Yellow / Appearance: Clear / SG: >=1.030 / pH: x  Gluc: x / Ketone: Negative  / Bili: Negative / Urobili: 0.2 mg/dL   Blood: x / Protein: Trace mg/dL / Nitrite: Negative   Leuk Esterase: Negative / RBC: Negative / WBC Negative   Sq Epi: x / Non Sq Epi: Moderate /HPF / Bacteria: x          Impression and Plan:    Primary Diagnosis:  Benzo/Opiate Dependency                                Medication: Pheno/suboxone    Secondary Diagnosis:                                                                                Medication:    Tertiary Diagnosis:                                                                                     Medication:      Continue Detox Protocols. Use of PRNS as needed for withdrawal and comfort.    monitor vss  monitor pt

## 2019-04-28 RX ORDER — BUPRENORPHINE AND NALOXONE 2; .5 MG/1; MG/1
1 TABLET SUBLINGUAL DAILY
Qty: 0 | Refills: 0 | Status: DISCONTINUED | OUTPATIENT
Start: 2019-04-28 | End: 2019-04-30

## 2019-04-28 RX ORDER — BUPRENORPHINE AND NALOXONE 2; .5 MG/1; MG/1
1 TABLET SUBLINGUAL DAILY
Qty: 0 | Refills: 0 | Status: DISCONTINUED | OUTPATIENT
Start: 2019-04-28 | End: 2019-04-28

## 2019-04-28 RX ORDER — BUPRENORPHINE AND NALOXONE 2; .5 MG/1; MG/1
1 TABLET SUBLINGUAL
Qty: 0 | Refills: 0 | Status: DISCONTINUED | OUTPATIENT
Start: 2019-04-28 | End: 2019-05-01

## 2019-04-28 RX ORDER — BUPRENORPHINE AND NALOXONE 2; .5 MG/1; MG/1
1 TABLET SUBLINGUAL EVERY 12 HOURS
Qty: 0 | Refills: 0 | Status: DISCONTINUED | OUTPATIENT
Start: 2019-04-28 | End: 2019-04-28

## 2019-04-28 RX ADMIN — GABAPENTIN 600 MILLIGRAM(S): 400 CAPSULE ORAL at 09:02

## 2019-04-28 RX ADMIN — Medication 1 PATCH: at 09:05

## 2019-04-28 RX ADMIN — GABAPENTIN 600 MILLIGRAM(S): 400 CAPSULE ORAL at 12:48

## 2019-04-28 RX ADMIN — Medication 1 PATCH: at 07:49

## 2019-04-28 RX ADMIN — Medication 1 TABLET(S): at 09:01

## 2019-04-28 RX ADMIN — Medication 48.6 MILLIGRAM(S): at 00:03

## 2019-04-28 RX ADMIN — BUPRENORPHINE AND NALOXONE 1 TABLET(S): 2; .5 TABLET SUBLINGUAL at 09:05

## 2019-04-28 RX ADMIN — Medication 50 MILLIGRAM(S): at 09:01

## 2019-04-28 RX ADMIN — ESCITALOPRAM OXALATE 20 MILLIGRAM(S): 10 TABLET, FILM COATED ORAL at 09:02

## 2019-04-28 RX ADMIN — Medication 1 PATCH: at 19:05

## 2019-04-28 RX ADMIN — Medication 50 MILLIGRAM(S): at 22:04

## 2019-04-28 RX ADMIN — Medication 1 PATCH: at 09:06

## 2019-04-28 RX ADMIN — GABAPENTIN 600 MILLIGRAM(S): 400 CAPSULE ORAL at 22:05

## 2019-04-28 RX ADMIN — Medication 32.4 MILLIGRAM(S): at 17:37

## 2019-04-28 RX ADMIN — Medication 32.4 MILLIGRAM(S): at 12:48

## 2019-04-28 RX ADMIN — QUETIAPINE FUMARATE 25 MILLIGRAM(S): 200 TABLET, FILM COATED ORAL at 09:01

## 2019-04-28 RX ADMIN — BUPRENORPHINE AND NALOXONE 1 TABLET(S): 2; .5 TABLET SUBLINGUAL at 17:37

## 2019-04-28 RX ADMIN — QUETIAPINE FUMARATE 100 MILLIGRAM(S): 200 TABLET, FILM COATED ORAL at 22:04

## 2019-04-28 NOTE — CHART NOTE - NSCHARTNOTEFT_GEN_A_CORE
Subsequent Inpatient Encounter                                       Detox Unit    MARQUITA CUMMINGS   39y   Female      Chief Complaint: tired    Follow up for Polysubstance  Dependency    HPI:     I reviewed previous notes. No Change, except if noted below.             Detail:_    ROS:   I reviewed with patient.  No changes from previous notes except if noted below.             Detail: _    PFSH I reviewed with patient. No changes from previous notes except if noted below.             Detail_    Medication reconciliation performed.    MEDICATIONS  (STANDING):  buprenorphine 8 mG/naloxone 2 mG SL Film 1 Film(s) SubLingual every 12 hours  escitalopram 20 milliGRAM(s) Oral daily  gabapentin 600 milliGRAM(s) Oral three times a day  hydrOXYzine hydrochloride 50 milliGRAM(s) Oral two times a day  multivitamin/minerals 1 Tablet(s) Oral daily  nicotine - 21 mG/24Hr(s) Patch 1 Patch Transdermal daily  PHENobarbital 32.4 milliGRAM(s) Oral every 6 hours  PHENobarbital   Oral   QUEtiapine 100 milliGRAM(s) Oral at bedtime  QUEtiapine 25 milliGRAM(s) Oral daily      MEDICATIONS  (PRN):  acetaminophen   Tablet .. 650 milliGRAM(s) Oral every 4 hours PRN Temp greater or equal to 38.5C (101.3F)  aluminum hydroxide/magnesium hydroxide/simethicone Suspension 30 milliLiter(s) Oral every 6 hours PRN Heartburn  bismuth subsalicylate Liquid 30 milliLiter(s) Oral every 6 hours PRN Diarrhea  cloNIDine 0.1 milliGRAM(s) Oral every 8 hours PRN Blood Pressure GREATER THAN 140/90 mmHG  cloNIDine 0.1 milliGRAM(s) Oral every 8 hours PRN opiate withdrawal  guaiFENesin/dextromethorphan  Syrup 5 milliLiter(s) Oral every 4 hours PRN Cough  ibuprofen  Tablet. 400 milliGRAM(s) Oral every 6 hours PRN Mild Pain (1 - 3)  magnesium hydroxide Suspension 30 milliLiter(s) Oral once PRN Constipation  methocarbamol 500 milliGRAM(s) Oral every 6 hours PRN muscle pain  PHENobarbital 32.4 milliGRAM(s) Oral every 4 hours PRN Withdrawal  pseudoephedrine 60 milliGRAM(s) Oral every 6 hours PRN Rhinitis  trimethobenzamide 300 milliGRAM(s) Oral every 6 hours PRN Nausea and/or Vomiting  trimethobenzamide Injectable 200 milliGRAM(s) IntraMuscular every 6 hours PRN Nausea and/or Vomiting      T(C): 36.2 (04-28-19 @ 06:13), Max: 37 (04-27-19 @ 11:58)  HR: 67 (04-28-19 @ 06:13) (67 - 83)  BP: 97/52 (04-28-19 @ 06:13) (97/52 - 117/74)  RR: 14 (04-28-19 @ 06:13) (14 - 16)  SpO2: --    PHYSICAL EXAM:      Constitutional: NAD, A&O x3    Eyes: PERRLA, no conjuctivitis    Respiratory: +air entry    Cardiovascular: +S1 and S2, regular rate and rhythm    Gastrointestinal: +BS, soft, non-tender, not distended    Extremities:  no edema, no calf tenderness    Skin: no rashes, normal turgor                            14.3   7.87  )-----------( 243      ( 26 Apr 2019 11:50 )             43.1   04-26    142  |  104  |  16  ----------------------------<  120<H>  4.4   |  27  |  1.1    Ca    9.4      26 Apr 2019 11:50  Mg     2.3     04-26    TPro  8.2<H>  /  Alb  4.5  /  TBili  0.2  /  DBili  x   /  AST  24  /  ALT  31  /  AlkPhos  83  04-26    Magnesium, Serum: 2.3 mg/dL (04-26-19 @ 11:50)  Hemoglobin A1C, Whole Blood: 5.6 % (04-26-19 @ 11:50)  Treponema Pallidum Antibody Interpretation: Negative (04-26-19 @ 11:50)  Hepatitis B Surface Antigen: Nonreact (04-26-19 @ 11:50)  Hepatitis C Virus S/CO Ratio: 0.10 S/CO (04-26-19 @ 11:50)    Hepatitis C Virus Interpretation: Nonreact (04-26-19 @ 11:50)      Urinalysis Basic - ( 26 Apr 2019 13:30 )    Color: Yellow / Appearance: Clear / SG: >=1.030 / pH: x  Gluc: x / Ketone: Negative  / Bili: Negative / Urobili: 0.2 mg/dL   Blood: x / Protein: Trace mg/dL / Nitrite: Negative   Leuk Esterase: Negative / RBC: Negative / WBC Negative   Sq Epi: x / Non Sq Epi: Moderate /HPF / Bacteria: x          Impression and Plan:    Primary Diagnosis:  Benzo/Opiate Dependency                                Medication: Pheno Protocol/suboxone    Secondary Diagnosis:                                                                                Medication:    Tertiary Diagnosis:                                                                                     Medication:      Continue Detox Protocols. Use of PRNS as needed for withdrawal and comfort.    Adjustments to protocols: suboxone  monitor vss  monitor pt

## 2019-04-29 DIAGNOSIS — Z71.89 OTHER SPECIFIED COUNSELING: ICD-10-CM

## 2019-04-29 LAB
AMPHET UR-MCNC: POSITIVE
BARBITURATES UR SCN-MCNC: POSITIVE
BENZODIAZ UR-MCNC: POSITIVE
COCAINE METAB.OTHER UR-MCNC: POSITIVE
DRUG SCREEN 1, URINE RESULT: SIGNIFICANT CHANGE UP
METHADONE UR-MCNC: NEGATIVE — SIGNIFICANT CHANGE UP
OPIATES UR-MCNC: POSITIVE
PCP UR-MCNC: NEGATIVE — SIGNIFICANT CHANGE UP
PROPOXYPHENE QUALITATIVE URINE RESULT: NEGATIVE — SIGNIFICANT CHANGE UP
THC UR QL: NEGATIVE — SIGNIFICANT CHANGE UP

## 2019-04-29 RX ADMIN — Medication 50 MILLIGRAM(S): at 09:18

## 2019-04-29 RX ADMIN — GABAPENTIN 600 MILLIGRAM(S): 400 CAPSULE ORAL at 13:00

## 2019-04-29 RX ADMIN — BUPRENORPHINE AND NALOXONE 1 TABLET(S): 2; .5 TABLET SUBLINGUAL at 17:40

## 2019-04-29 RX ADMIN — Medication 1 PATCH: at 09:21

## 2019-04-29 RX ADMIN — GABAPENTIN 600 MILLIGRAM(S): 400 CAPSULE ORAL at 21:06

## 2019-04-29 RX ADMIN — Medication 50 MILLIGRAM(S): at 21:06

## 2019-04-29 RX ADMIN — GABAPENTIN 600 MILLIGRAM(S): 400 CAPSULE ORAL at 09:19

## 2019-04-29 RX ADMIN — QUETIAPINE FUMARATE 25 MILLIGRAM(S): 200 TABLET, FILM COATED ORAL at 09:18

## 2019-04-29 RX ADMIN — Medication 1 PATCH: at 09:22

## 2019-04-29 RX ADMIN — BUPRENORPHINE AND NALOXONE 1 TABLET(S): 2; .5 TABLET SUBLINGUAL at 09:20

## 2019-04-29 RX ADMIN — ESCITALOPRAM OXALATE 20 MILLIGRAM(S): 10 TABLET, FILM COATED ORAL at 09:19

## 2019-04-29 RX ADMIN — Medication 1 TABLET(S): at 09:18

## 2019-04-29 RX ADMIN — QUETIAPINE FUMARATE 100 MILLIGRAM(S): 200 TABLET, FILM COATED ORAL at 21:06

## 2019-04-29 NOTE — CHART NOTE - NSCHARTNOTEFT_GEN_A_CORE
Subsequent Inpatient Encounter                                       Detox Unit    MARQUITA CUMMINGS   39y   Female      Chief Complaint: tired    Follow up for Polysubstance  Dependency    HPI:     I reviewed previous notes. No Change, except if noted below.             Detail:_    ROS:   I reviewed with patient.  No changes from previous notes except if noted below.             Detail: _    PFSH I reviewed with patient. No changes from previous notes except if noted below.             Detail_    Medication reconciliation performed.    MEDICATIONS  (STANDING):  buprenorphine 8 mG/naloxone 2 mG SL Film 1 Film(s) SubLingual every 12 hours  escitalopram 20 milliGRAM(s) Oral daily  gabapentin 600 milliGRAM(s) Oral three times a day  hydrOXYzine hydrochloride 50 milliGRAM(s) Oral two times a day  multivitamin/minerals 1 Tablet(s) Oral daily  nicotine - 21 mG/24Hr(s) Patch 1 Patch Transdermal daily  PHENobarbital 32.4 milliGRAM(s) Oral every 6 hours  PHENobarbital   Oral   QUEtiapine 100 milliGRAM(s) Oral at bedtime  QUEtiapine 25 milliGRAM(s) Oral daily      MEDICATIONS  (PRN):  acetaminophen   Tablet .. 650 milliGRAM(s) Oral every 4 hours PRN Temp greater or equal to 38.5C (101.3F)  aluminum hydroxide/magnesium hydroxide/simethicone Suspension 30 milliLiter(s) Oral every 6 hours PRN Heartburn  bismuth subsalicylate Liquid 30 milliLiter(s) Oral every 6 hours PRN Diarrhea  cloNIDine 0.1 milliGRAM(s) Oral every 8 hours PRN Blood Pressure GREATER THAN 140/90 mmHG  cloNIDine 0.1 milliGRAM(s) Oral every 8 hours PRN opiate withdrawal  guaiFENesin/dextromethorphan  Syrup 5 milliLiter(s) Oral every 4 hours PRN Cough  ibuprofen  Tablet. 400 milliGRAM(s) Oral every 6 hours PRN Mild Pain (1 - 3)  magnesium hydroxide Suspension 30 milliLiter(s) Oral once PRN Constipation  methocarbamol 500 milliGRAM(s) Oral every 6 hours PRN muscle pain  PHENobarbital 32.4 milliGRAM(s) Oral every 4 hours PRN Withdrawal  pseudoephedrine 60 milliGRAM(s) Oral every 6 hours PRN Rhinitis  trimethobenzamide 300 milliGRAM(s) Oral every 6 hours PRN Nausea and/or Vomiting  trimethobenzamide Injectable 200 milliGRAM(s) IntraMuscular every 6 hours PRN Nausea and/or Vomiting      T(C): 36.2 (04-28-19 @ 06:13), Max: 37 (04-27-19 @ 11:58)  HR: 67 (04-28-19 @ 06:13) (67 - 83)  BP: 97/52 (04-28-19 @ 06:13) (97/52 - 117/74)  RR: 14 (04-28-19 @ 06:13) (14 - 16)  SpO2: --    PHYSICAL EXAM:      Constitutional: NAD, A&O x3    Eyes: PERRLA, no conjuctivitis    Respiratory: +air entry    Cardiovascular: +S1 and S2, regular rate and rhythm    Gastrointestinal: +BS, soft, non-tender, not distended    Extremities:  no edema, no calf tenderness    Skin: no rashes, normal turgor                            14.3   7.87  )-----------( 243      ( 26 Apr 2019 11:50 )             43.1   04-26    142  |  104  |  16  ----------------------------<  120<H>  4.4   |  27  |  1.1    Ca    9.4      26 Apr 2019 11:50  Mg     2.3     04-26    TPro  8.2<H>  /  Alb  4.5  /  TBili  0.2  /  DBili  x   /  AST  24  /  ALT  31  /  AlkPhos  83  04-26    Magnesium, Serum: 2.3 mg/dL (04-26-19 @ 11:50)  Hemoglobin A1C, Whole Blood: 5.6 % (04-26-19 @ 11:50)  Treponema Pallidum Antibody Interpretation: Negative (04-26-19 @ 11:50)  Hepatitis B Surface Antigen: Nonreact (04-26-19 @ 11:50)  Hepatitis C Virus S/CO Ratio: 0.10 S/CO (04-26-19 @ 11:50)    Hepatitis C Virus Interpretation: Nonreact (04-26-19 @ 11:50)      Urinalysis Basic - ( 26 Apr 2019 13:30 )    Color: Yellow / Appearance: Clear / SG: >=1.030 / pH: x  Gluc: x / Ketone: Negative  / Bili: Negative / Urobili: 0.2 mg/dL   Blood: x / Protein: Trace mg/dL / Nitrite: Negative   Leuk Esterase: Negative / RBC: Negative / WBC Negative   Sq Epi: x / Non Sq Epi: Moderate /HPF / Bacteria: x          Impression and Plan:    Primary Diagnosis:  Benzo/Opiate Dependency                                Medication: Pheno Protocol/suboxone    Secondary Diagnosis: MDD/DIANA                                                      Medication: c/w Lexapro and Gabapentin    Tertiary Diagnosis:  Bipolar D/O                                                         Medication: c/w Seroquel      Continue Detox Protocols. Use of PRNS as needed for withdrawal and comfort.    Adjustments to protocols: suboxone  monitor vss  monitor pt

## 2019-04-30 RX ORDER — GABAPENTIN 400 MG/1
1 CAPSULE ORAL
Qty: 0 | Refills: 0 | COMMUNITY
Start: 2019-04-30

## 2019-04-30 RX ORDER — QUETIAPINE FUMARATE 200 MG/1
1 TABLET, FILM COATED ORAL
Qty: 0 | Refills: 0 | COMMUNITY
Start: 2019-04-30

## 2019-04-30 RX ORDER — BUPRENORPHINE AND NALOXONE 2; .5 MG/1; MG/1
1 TABLET SUBLINGUAL
Qty: 0 | Refills: 0 | COMMUNITY

## 2019-04-30 RX ORDER — GABAPENTIN 400 MG/1
1 CAPSULE ORAL
Qty: 0 | Refills: 0 | COMMUNITY

## 2019-04-30 RX ORDER — ESCITALOPRAM OXALATE 10 MG/1
1 TABLET, FILM COATED ORAL
Qty: 0 | Refills: 0 | COMMUNITY

## 2019-04-30 RX ORDER — QUETIAPINE FUMARATE 200 MG/1
1 TABLET, FILM COATED ORAL
Qty: 0 | Refills: 0 | COMMUNITY

## 2019-04-30 RX ORDER — CLONAZEPAM 1 MG
1 TABLET ORAL
Qty: 0 | Refills: 0 | COMMUNITY

## 2019-04-30 RX ORDER — QUETIAPINE FUMARATE 200 MG/1
100 TABLET, FILM COATED ORAL
Qty: 0 | Refills: 0 | COMMUNITY

## 2019-04-30 RX ORDER — BUPRENORPHINE AND NALOXONE 2; .5 MG/1; MG/1
1 TABLET SUBLINGUAL
Qty: 0 | Refills: 0 | COMMUNITY
Start: 2019-04-30

## 2019-04-30 RX ADMIN — Medication 50 MILLIGRAM(S): at 21:38

## 2019-04-30 RX ADMIN — QUETIAPINE FUMARATE 25 MILLIGRAM(S): 200 TABLET, FILM COATED ORAL at 10:03

## 2019-04-30 RX ADMIN — GABAPENTIN 600 MILLIGRAM(S): 400 CAPSULE ORAL at 21:39

## 2019-04-30 RX ADMIN — Medication 1 PATCH: at 10:09

## 2019-04-30 RX ADMIN — GABAPENTIN 600 MILLIGRAM(S): 400 CAPSULE ORAL at 12:07

## 2019-04-30 RX ADMIN — Medication 1 PATCH: at 18:43

## 2019-04-30 RX ADMIN — BUPRENORPHINE AND NALOXONE 1 TABLET(S): 2; .5 TABLET SUBLINGUAL at 18:41

## 2019-04-30 RX ADMIN — Medication 32.4 MILLIGRAM(S): at 07:01

## 2019-04-30 RX ADMIN — Medication 1 PATCH: at 10:08

## 2019-04-30 RX ADMIN — GABAPENTIN 600 MILLIGRAM(S): 400 CAPSULE ORAL at 10:03

## 2019-04-30 RX ADMIN — ESCITALOPRAM OXALATE 20 MILLIGRAM(S): 10 TABLET, FILM COATED ORAL at 10:05

## 2019-04-30 RX ADMIN — Medication 50 MILLIGRAM(S): at 10:06

## 2019-04-30 RX ADMIN — Medication 1 PATCH: at 06:46

## 2019-04-30 RX ADMIN — QUETIAPINE FUMARATE 100 MILLIGRAM(S): 200 TABLET, FILM COATED ORAL at 21:39

## 2019-04-30 RX ADMIN — Medication 32.4 MILLIGRAM(S): at 18:59

## 2019-04-30 RX ADMIN — Medication 1 TABLET(S): at 10:06

## 2019-04-30 RX ADMIN — METHOCARBAMOL 500 MILLIGRAM(S): 500 TABLET, FILM COATED ORAL at 21:39

## 2019-04-30 RX ADMIN — BUPRENORPHINE AND NALOXONE 1 TABLET(S): 2; .5 TABLET SUBLINGUAL at 10:24

## 2019-04-30 NOTE — CHART NOTE - NSCHARTNOTEFT_GEN_A_CORE
Subsequent Inpatient Encounter                                       Detox Unit    MARQUITA CUMMINGS   39y   Female      Chief Complaint: tired    Follow up for Polysubstance  Dependency    HPI:     I reviewed previous notes. No Change, except if noted below.             Detail:_    ROS:   I reviewed with patient.  No changes from previous notes except if noted below.             Detail: _    PFSH I reviewed with patient. No changes from previous notes except if noted below.             Detail_    Medication reconciliation performed.    MEDICATIONS  (STANDING):  buprenorphine 8 mG/naloxone 2 mG SL Film 1 Film(s) SubLingual every 12 hours  escitalopram 20 milliGRAM(s) Oral daily  gabapentin 600 milliGRAM(s) Oral three times a day  hydrOXYzine hydrochloride 50 milliGRAM(s) Oral two times a day  multivitamin/minerals 1 Tablet(s) Oral daily  nicotine - 21 mG/24Hr(s) Patch 1 Patch Transdermal daily  PHENobarbital 32.4 milliGRAM(s) Oral every 6 hours  PHENobarbital   Oral   QUEtiapine 100 milliGRAM(s) Oral at bedtime  QUEtiapine 25 milliGRAM(s) Oral daily      MEDICATIONS  (PRN):  acetaminophen   Tablet .. 650 milliGRAM(s) Oral every 4 hours PRN Temp greater or equal to 38.5C (101.3F)  aluminum hydroxide/magnesium hydroxide/simethicone Suspension 30 milliLiter(s) Oral every 6 hours PRN Heartburn  bismuth subsalicylate Liquid 30 milliLiter(s) Oral every 6 hours PRN Diarrhea  cloNIDine 0.1 milliGRAM(s) Oral every 8 hours PRN Blood Pressure GREATER THAN 140/90 mmHG  cloNIDine 0.1 milliGRAM(s) Oral every 8 hours PRN opiate withdrawal  guaiFENesin/dextromethorphan  Syrup 5 milliLiter(s) Oral every 4 hours PRN Cough  ibuprofen  Tablet. 400 milliGRAM(s) Oral every 6 hours PRN Mild Pain (1 - 3)  magnesium hydroxide Suspension 30 milliLiter(s) Oral once PRN Constipation  methocarbamol 500 milliGRAM(s) Oral every 6 hours PRN muscle pain  PHENobarbital 32.4 milliGRAM(s) Oral every 4 hours PRN Withdrawal  pseudoephedrine 60 milliGRAM(s) Oral every 6 hours PRN Rhinitis  trimethobenzamide 300 milliGRAM(s) Oral every 6 hours PRN Nausea and/or Vomiting  trimethobenzamide Injectable 200 milliGRAM(s) IntraMuscular every 6 hours PRN Nausea and/or Vomiting      T(C): 36.2 (04-28-19 @ 06:13), Max: 37 (04-27-19 @ 11:58)  HR: 67 (04-28-19 @ 06:13) (67 - 83)  BP: 97/52 (04-28-19 @ 06:13) (97/52 - 117/74)  RR: 14 (04-28-19 @ 06:13) (14 - 16)  SpO2: --    PHYSICAL EXAM:      Constitutional: NAD, A&O x3    Eyes: PERRLA, no conjuctivitis    Respiratory: +air entry    Cardiovascular: +S1 and S2, regular rate and rhythm    Gastrointestinal: +BS, soft, non-tender, not distended    Extremities:  no edema, no calf tenderness    Skin: no rashes, normal turgor                            14.3   7.87  )-----------( 243      ( 26 Apr 2019 11:50 )             43.1   04-26    142  |  104  |  16  ----------------------------<  120<H>  4.4   |  27  |  1.1    Ca    9.4      26 Apr 2019 11:50  Mg     2.3     04-26    TPro  8.2<H>  /  Alb  4.5  /  TBili  0.2  /  DBili  x   /  AST  24  /  ALT  31  /  AlkPhos  83  04-26    Magnesium, Serum: 2.3 mg/dL (04-26-19 @ 11:50)  Hemoglobin A1C, Whole Blood: 5.6 % (04-26-19 @ 11:50)  Treponema Pallidum Antibody Interpretation: Negative (04-26-19 @ 11:50)  Hepatitis B Surface Antigen: Nonreact (04-26-19 @ 11:50)  Hepatitis C Virus S/CO Ratio: 0.10 S/CO (04-26-19 @ 11:50)    Hepatitis C Virus Interpretation: Nonreact (04-26-19 @ 11:50)      Urinalysis Basic - ( 26 Apr 2019 13:30 )    Color: Yellow / Appearance: Clear / SG: >=1.030 / pH: x  Gluc: x / Ketone: Negative  / Bili: Negative / Urobili: 0.2 mg/dL   Blood: x / Protein: Trace mg/dL / Nitrite: Negative   Leuk Esterase: Negative / RBC: Negative / WBC Negative   Sq Epi: x / Non Sq Epi: Moderate /HPF / Bacteria: x          Impression and Plan:    Primary Diagnosis:  Benzo/Opiate Dependency                                Medication: Pheno Protocol/suboxone    Secondary Diagnosis: MDD/DIANA                                                      Medication: c/w Lexapro and Gabapentin    Tertiary Diagnosis:  Bipolar D/O                                                         Medication: c/w Seroquel    Psych consult pending    Continue Detox Protocols. Use of PRNS as needed for withdrawal and comfort.    Adjustments to protocols: suboxone  monitor vss  monitor pt

## 2019-05-01 VITALS
RESPIRATION RATE: 14 BRPM | HEART RATE: 60 BPM | SYSTOLIC BLOOD PRESSURE: 97 MMHG | TEMPERATURE: 98 F | DIASTOLIC BLOOD PRESSURE: 65 MMHG

## 2019-05-01 DIAGNOSIS — F19.20 OTHER PSYCHOACTIVE SUBSTANCE DEPENDENCE, UNCOMPLICATED: ICD-10-CM

## 2019-05-01 DIAGNOSIS — F41.9 ANXIETY DISORDER, UNSPECIFIED: ICD-10-CM

## 2019-05-01 PROCEDURE — 99222 1ST HOSP IP/OBS MODERATE 55: CPT

## 2019-05-01 NOTE — BEHAVIORAL HEALTH ASSESSMENT NOTE - RISK ASSESSMENT
At this time, patient is not considered an imminent danger to herself or others and does not need inpatient psychiatric hospitalization. patient will benefit from continuation of her current psychotropic medication as they seem to be working well for her. There is no indication for medication adjustment at this time. Patient is agreeable to go to 28 day Substance use disorder rehab after which she wants to go to long term rehab.

## 2019-05-01 NOTE — BEHAVIORAL HEALTH ASSESSMENT NOTE - SUMMARY
Ms Wayne is a 39 year old woman with a reported history of Bipolar disorder , PTSD, OCD, and polysubstance dependence who was admitted to the detox unit for detox from heroine , crack cocaine and benzodiazepines. Patient does not lizz any symptoms suggestive of acute psychosis, bruce or acute decompensation of a major depressive disorder. She denies current suicidal ideations, intent or plan . Patient appears to be psychiatrically stable on her current psychotropic medications.

## 2019-05-01 NOTE — BEHAVIORAL HEALTH ASSESSMENT NOTE - HPI (INCLUDE ILLNESS QUALITY, SEVERITY, DURATION, TIMING, CONTEXT, MODIFYING FACTORS, ASSOCIATED SIGNS AND SYMPTOMS)
Ms Wayne is a 39 year old  woman, single, has no children, resides with her boyfriend, unemployed ,  with a reported history of Bipolar disorder , PTSD, OCD, and polysubstance dependence who was admitted to the detox unit for detox from heroin , crack cocaine and benzodiazepines.  patient reports that she just completed her detox and is scheduled to start her 28 day rehab today after which she will be going to long term rehab. She reports that she has been on Lexapro and Seroquel for over 3 year however she does not have a psychiatrist. She denies having any acute symptoms of depression , psychosis , bruce or PTSD . She denies current suicidal thoughts , intent or plan.  As per staff of CDU, patient is scheduled to be picked up to go to rehab this morning .

## 2019-05-01 NOTE — BEHAVIORAL HEALTH ASSESSMENT NOTE - NSBHCONSULTFOLLOWAFTERCARE_PSY_A_CORE FT
Patient is going to long term rehab and will be assigned an outpatient psychiatrist on completion of her stay there.   Patient can follow up at [facility] , [address] [place] [address], phone number ; [phone] upon discharge from long term rehab if she desires

## 2019-05-01 NOTE — BEHAVIORAL HEALTH ASSESSMENT NOTE - NSBHCHARTREVIEWVS_PSY_A_CORE FT
Vital Signs Last 24 Hrs  T(C): 36.1 (01 May 2019 00:00), Max: 36.1 (01 May 2019 00:00)  T(F): 97 (01 May 2019 00:00), Max: 97 (01 May 2019 00:00)  HR: 59 (01 May 2019 00:00) (59 - 61)  BP: 120/58 (01 May 2019 00:00) (100/63 - 120/58)  BP(mean): --  RR: 16 (01 May 2019 00:00) (16 - 16)  SpO2: --

## 2019-05-01 NOTE — CHART NOTE - NSCHARTNOTEFT_GEN_A_CORE
Allergies:  No Known Allergies      Diet: Regular    Activity: as tolerated    Follow up with    1. PMD in 2 weeks    2. Psych in 2 weeks    Follow up for abnormal labs/tests    Extra Instructions:      Flu Vaccine given  Yes_____         No______      Diagnosis:  Chemical Dependency   Maintain sobriety  refrain from all use      Patient Signature___________________________________________  Date_________________      Nurse Signature_____________________________________________Date_________________

## 2019-05-01 NOTE — BEHAVIORAL HEALTH ASSESSMENT NOTE - NSBHCHARTREVIEWLAB_PSY_A_CORE FT
Barbiturates Screen, Urine (04.29.19 @ 11:30)    Barbiturates Screen, Urine: Positive  Opiate, Urine (04.29.19 @ 11:30)    Opiate, Urine: Positive  Cocaine Metabolite, Urine (04.29.19 @ 11:30)    Cocaine Metabolite, Urine: Positive  Benzodiazepine, Urine (04.29.19 @ 11:30)    Benzodiazepine, Urine: Positive  Urinalysis (04.26.19 @ 13:30)    Glucose Qualitative, Urine: Negative mg/dL    Blood, Urine: Negative    pH Urine: 5.5    Color: Yellow    Urine Appearance: Clear    Bilirubin: Negative    Ketone - Urine: Negative    Specific Gravity: >=1.030    Protein, Urine: Trace mg/dL    Urobilinogen: 0.2 mg/dL    Nitrite: Negative    Leukocyte Esterase Concentration: Negative

## 2019-05-06 DIAGNOSIS — F13.20 SEDATIVE, HYPNOTIC OR ANXIOLYTIC DEPENDENCE, UNCOMPLICATED: ICD-10-CM

## 2019-05-06 DIAGNOSIS — E78.00 PURE HYPERCHOLESTEROLEMIA, UNSPECIFIED: ICD-10-CM

## 2019-05-06 DIAGNOSIS — Z91.19 PATIENT'S NONCOMPLIANCE WITH OTHER MEDICAL TREATMENT AND REGIMEN: ICD-10-CM

## 2019-05-06 DIAGNOSIS — F31.9 BIPOLAR DISORDER, UNSPECIFIED: ICD-10-CM

## 2019-05-06 DIAGNOSIS — Z56.0 UNEMPLOYMENT, UNSPECIFIED: ICD-10-CM

## 2019-05-06 DIAGNOSIS — F11.20 OPIOID DEPENDENCE, UNCOMPLICATED: ICD-10-CM

## 2019-05-06 DIAGNOSIS — F41.9 ANXIETY DISORDER, UNSPECIFIED: ICD-10-CM

## 2019-05-06 DIAGNOSIS — Z91.5 PERSONAL HISTORY OF SELF-HARM: ICD-10-CM

## 2019-05-06 DIAGNOSIS — E66.9 OBESITY, UNSPECIFIED: ICD-10-CM

## 2019-05-06 DIAGNOSIS — F17.200 NICOTINE DEPENDENCE, UNSPECIFIED, UNCOMPLICATED: ICD-10-CM

## 2019-05-06 DIAGNOSIS — G47.00 INSOMNIA, UNSPECIFIED: ICD-10-CM

## 2019-05-06 DIAGNOSIS — F43.10 POST-TRAUMATIC STRESS DISORDER, UNSPECIFIED: ICD-10-CM

## 2019-05-06 SDOH — ECONOMIC STABILITY - INCOME SECURITY: UNEMPLOYMENT, UNSPECIFIED: Z56.0

## 2020-05-01 ENCOUNTER — OUTPATIENT (OUTPATIENT)
Dept: OUTPATIENT SERVICES | Facility: HOSPITAL | Age: 40
LOS: 1 days | End: 2020-05-01
Payer: MEDICAID

## 2020-05-01 DIAGNOSIS — Z98.890 OTHER SPECIFIED POSTPROCEDURAL STATES: Chronic | ICD-10-CM

## 2020-05-01 PROCEDURE — G9001: CPT

## 2020-05-13 DIAGNOSIS — Z71.89 OTHER SPECIFIED COUNSELING: ICD-10-CM

## 2020-05-13 PROBLEM — B97.7 PAPILLOMAVIRUS AS THE CAUSE OF DISEASES CLASSIFIED ELSEWHERE: Chronic | Status: ACTIVE | Noted: 2019-04-26

## 2020-05-13 PROBLEM — E78.00 PURE HYPERCHOLESTEROLEMIA, UNSPECIFIED: Chronic | Status: ACTIVE | Noted: 2019-04-26

## 2023-05-12 NOTE — CHART NOTE - NSCHARTNOTEFT_GEN_A_CORE
The patient was admitted to the inpt detox unit [facility], for   ETOH____ Opioid_x__  Benzo_x___Polysubstance _____ Dependency.    Pt was admitted from ED____, Intake__x__, Med/surg Floor_______.    Details are present in the preceding History & Physical section and follow up chart notes.  patient was evaluated on daily detox team  rounds.  Withdrawal symptoms and signs were reviewed on a daily basis, and the protocols were adjusted accordingly.    Labs and imaging results were reviewed and discussed with the patient.    All questions from the patient were addressed.  The patient was seen by the Chemical dependency counselors, and different options for after care were discussed.  The patient attended groups, meetings and 1:1 sessions with the counselors.  Narcane Kit was offered and instructions given prior to discharge.    Psychiatry consultation reviewed___x__, N/A_____    Physical therapy evaluation reviewed_____, N/A_x___    Pt was given copies of labs and imaging reports, if applicable.    Prescriptions if needed, were sent through [facility] system to the pharmacy amnd are noted in the discharge instruction sheet.    After care was arranged by counselors and pt was discharged to:    Home___, Outpt. Program___, Rehab _x__, Long term____ [facility] ____ [facility]____ SNF____, AMA___, Admin Discharge____    Principal Diagnosis: Alcohol Dependency____ Opioid Dependency_x__ Benzo Dependency__x__ Polysubstance Dependency____ Universal Safety Interventions